# Patient Record
Sex: FEMALE | Race: WHITE | NOT HISPANIC OR LATINO | Employment: OTHER | URBAN - METROPOLITAN AREA
[De-identification: names, ages, dates, MRNs, and addresses within clinical notes are randomized per-mention and may not be internally consistent; named-entity substitution may affect disease eponyms.]

---

## 2023-06-13 ENCOUNTER — NURSING HOME VISIT (OUTPATIENT)
Dept: POST ACUTE CARE | Facility: EXTERNAL LOCATION | Age: 73
End: 2023-06-13
Payer: COMMERCIAL

## 2023-06-13 VITALS
SYSTOLIC BLOOD PRESSURE: 115 MMHG | DIASTOLIC BLOOD PRESSURE: 72 MMHG | BODY MASS INDEX: 28.49 KG/M2 | TEMPERATURE: 97.5 F | OXYGEN SATURATION: 93 % | HEART RATE: 100 BPM | WEIGHT: 145.9 LBS

## 2023-06-13 DIAGNOSIS — J43.8 OTHER EMPHYSEMA (MULTI): ICD-10-CM

## 2023-06-13 DIAGNOSIS — R33.8 ACUTE RETENTION OF URINE: ICD-10-CM

## 2023-06-13 DIAGNOSIS — R91.1 LUNG NODULE: Primary | ICD-10-CM

## 2023-06-13 PROBLEM — E78.5 HLD (HYPERLIPIDEMIA): Status: ACTIVE | Noted: 2023-06-13

## 2023-06-13 PROBLEM — F33.9 RECURRENT MAJOR DEPRESSIVE DISORDER (CMS-HCC): Status: ACTIVE | Noted: 2023-06-13

## 2023-06-13 PROBLEM — I10 HTN (HYPERTENSION): Status: ACTIVE | Noted: 2023-06-13

## 2023-06-13 PROBLEM — K21.9 GERD (GASTROESOPHAGEAL REFLUX DISEASE): Status: ACTIVE | Noted: 2023-06-13

## 2023-06-13 PROBLEM — S72.002A HIP FRACTURE, LEFT (MULTI): Status: RESOLVED | Noted: 2023-06-13 | Resolved: 2023-06-13

## 2023-06-13 PROBLEM — D64.9 CHRONIC ANEMIA: Status: ACTIVE | Noted: 2023-06-13

## 2023-06-13 PROBLEM — F10.10 ETOH ABUSE: Status: RESOLVED | Noted: 2023-06-13 | Resolved: 2023-06-13

## 2023-06-13 PROBLEM — E55.9 VITAMIN D DEFICIENCY: Status: ACTIVE | Noted: 2023-06-13

## 2023-06-13 PROBLEM — E46 HYPOALBUMINEMIA DUE TO PROTEIN-CALORIE MALNUTRITION (MULTI): Status: ACTIVE | Noted: 2023-06-13

## 2023-06-13 PROBLEM — E51.9 THIAMINE DEFICIENCY: Status: ACTIVE | Noted: 2023-06-13

## 2023-06-13 PROBLEM — C50.912 BREAST CANCER, LEFT (MULTI): Status: ACTIVE | Noted: 2023-06-13

## 2023-06-13 PROBLEM — E88.09 HYPOALBUMINEMIA DUE TO PROTEIN-CALORIE MALNUTRITION (MULTI): Status: ACTIVE | Noted: 2023-06-13

## 2023-06-13 PROBLEM — J44.9 COPD (CHRONIC OBSTRUCTIVE PULMONARY DISEASE) (MULTI): Status: ACTIVE | Noted: 2023-06-13

## 2023-06-13 PROBLEM — K59.09 CHRONIC CONSTIPATION: Status: ACTIVE | Noted: 2023-06-13

## 2023-06-13 PROCEDURE — 99349 HOME/RES VST EST MOD MDM 40: CPT | Performed by: NURSE PRACTITIONER

## 2023-06-13 RX ORDER — ERGOCALCIFEROL 1.25 MG/1
1 CAPSULE ORAL
COMMUNITY
Start: 2021-09-03

## 2023-06-13 RX ORDER — ACETAMINOPHEN 325 MG/1
TABLET ORAL EVERY 6 HOURS
COMMUNITY
Start: 2021-09-03

## 2023-06-13 RX ORDER — METOPROLOL SUCCINATE 25 MG/1
25 TABLET, EXTENDED RELEASE ORAL DAILY
COMMUNITY
Start: 2021-09-03

## 2023-06-13 RX ORDER — TORSEMIDE 20 MG/1
1 TABLET ORAL DAILY
COMMUNITY
Start: 2021-11-23

## 2023-06-13 RX ORDER — THIAMINE HCL 50 MG
1 TABLET ORAL DAILY
COMMUNITY
Start: 2021-09-03

## 2023-06-13 RX ORDER — PANTOPRAZOLE SODIUM 40 MG/1
1 TABLET, DELAYED RELEASE ORAL DAILY
COMMUNITY
Start: 2021-09-03

## 2023-06-13 RX ORDER — AMOXICILLIN 250 MG
1 CAPSULE ORAL DAILY
COMMUNITY
Start: 2021-09-03

## 2023-06-13 RX ORDER — TRAZODONE HYDROCHLORIDE 50 MG/1
1 TABLET ORAL NIGHTLY
COMMUNITY
Start: 2022-05-24

## 2023-06-13 RX ORDER — POTASSIUM CHLORIDE 1500 MG/1
1 TABLET, EXTENDED RELEASE ORAL DAILY
COMMUNITY
Start: 2021-09-03

## 2023-06-13 RX ORDER — MIRTAZAPINE 15 MG/1
15 TABLET, FILM COATED ORAL NIGHTLY
COMMUNITY
Start: 2021-09-03

## 2023-06-13 NOTE — PROGRESS NOTES
Subjective   Christie Guadarrama is a 72 y.o. female who is assisted living/ home patient being seen and evaluated for multiple medical problems.    72y old female returned after a hospital stay at [facility] for COPD,Hypoxia and RLL pnuemonia, also noted was 1.6cm nodule LUIS FERNANDO increased in size since 2018 (slow growing malignancy). She was treated with ATB, steroids and BIPAP. She was weaned to 3L NC and [place] back to [place]. She will follow up with pulmonary in 2 weeks (Dr Hernandez). She has a morse catheter secondary to urinary retention, if voiding trial fails she will follow up with urology.     She is doing well, ambulates with walker, 3L 02 with SPO2 97-98%, RA SPO2 reported as 93-95%. She reports some SOB with exertion. No distress.    50% of time was spent on preparing to see the patient, performing exam or evaluation, coordination of care, ordering medications,tests and labs, referring and communicating with other health care providers , interpreting results, obtaining and reviewing history, tests, medications and documenting clinical information  EMR. Time spent >35 minutes             Objective   /72   Pulse 100   Temp 36.4 °C (97.5 °F)   Wt 66.2 kg (145 lb 14.4 oz)   SpO2 93%   BMI 28.49 kg/m²     Physical Exam  Vitals and nursing note reviewed.   Constitutional:       General: She is not in acute distress.  HENT:      Head: Normocephalic and atraumatic.   Cardiovascular:      Rate and Rhythm: Normal rate and regular rhythm.   Pulmonary:      Effort: Pulmonary effort is normal.      Breath sounds: Normal breath sounds.   Skin:     General: Skin is warm and dry.   Neurological:      Mental Status: She is alert.   Psychiatric:         Mood and Affect: Mood normal.         Assessment/Plan   Problem List Items Addressed This Visit       Other emphysema (CMS/HCC)     Use 3LNC at HS  Can be on room air prn during the day  Check SP02 TID on RA with ambulation  Follow up pulmonary 2 weeks (Dr Hernandez)         Lung  nodule - Primary     Found 2018  1.6cm LUIS FERNANDO  Following with pulmonary         Acute retention of urine     DC morse catheter  If no void in 8 hrs, staff to do a bladder scan and report to MD or NP        Check CBC, BMP 6/16

## 2023-06-13 NOTE — ASSESSMENT & PLAN NOTE
Use 3LNC at HS  Can be on room air prn during the day  Check SP02 TID on RA with ambulation  Follow up pulmonary 2 weeks (Dr Hernandez)

## 2023-06-13 NOTE — LETTER
Patient: Christie Guadarrama  : 1950    Encounter Date: 2023    Subjective  Christie Guadarrama is a 72 y.o. female who is assisted living/ home patient being seen and evaluated for multiple medical problems.    72y old female returned after a hospital stay at [facility] for COPD,Hypoxia and RLL pnuemonia, also noted was 1.6cm nodule LUIS FERNANDO increased in size since 2018 (slow growing malignancy). She was treated with ATB, steroids and BIPAP. She was weaned to 3L NC and [place] back to [place]. She will follow up with pulmonary in 2 weeks (Dr Hernandez). She has a morse catheter secondary to urinary retention, if voiding trial fails she will follow up with urology.     She is doing well, ambulates with walker, 3L 02 with SPO2 97-98%, RA SPO2 reported as 93-95%. She reports some SOB with exertion. No distress.    50% of time was spent on preparing to see the patient, performing exam or evaluation, coordination of care, ordering medications,tests and labs, referring and communicating with other health care providers , interpreting results, obtaining and reviewing history, tests, medications and documenting clinical information  EMR. Time spent >35 minutes             Objective  /72   Pulse 100   Temp 36.4 °C (97.5 °F)   Wt 66.2 kg (145 lb 14.4 oz)   SpO2 93%   BMI 28.49 kg/m²     Physical Exam  Vitals and nursing note reviewed.   Constitutional:       General: She is not in acute distress.  HENT:      Head: Normocephalic and atraumatic.   Cardiovascular:      Rate and Rhythm: Normal rate and regular rhythm.   Pulmonary:      Effort: Pulmonary effort is normal.      Breath sounds: Normal breath sounds.   Skin:     General: Skin is warm and dry.   Neurological:      Mental Status: She is alert.   Psychiatric:         Mood and Affect: Mood normal.         Assessment/Plan  Problem List Items Addressed This Visit       Other emphysema (CMS/HCC)     Use 3LNC at HS  Can be on room air prn during the day  Check SP02 TID on RA with  ambulation  Follow up pulmonary 2 weeks (Dr Hernandez)         Lung nodule - Primary     Found 2018  1.6cm LUIS FERNANDO  Following with pulmonary         Acute retention of urine     DC morse catheter  If no void in 8 hrs, staff to do a bladder scan and report to MD or NP        Check CBC, BMP 6/16      Electronically Signed By: BEATRIZ Benjamin-CNP   6/13/23  1:19 PM

## 2023-06-15 ENCOUNTER — NURSING HOME VISIT (OUTPATIENT)
Dept: POST ACUTE CARE | Facility: EXTERNAL LOCATION | Age: 73
End: 2023-06-15
Payer: COMMERCIAL

## 2023-06-15 DIAGNOSIS — R33.8 ACUTE RETENTION OF URINE: ICD-10-CM

## 2023-06-15 DIAGNOSIS — J43.8 OTHER EMPHYSEMA (MULTI): Primary | ICD-10-CM

## 2023-06-15 PROCEDURE — 99348 HOME/RES VST EST LOW MDM 30: CPT | Performed by: NURSE PRACTITIONER

## 2023-06-15 NOTE — LETTER
Patient: Christie Guadarrama  : 1950    Encounter Date: 06/15/2023    Subjective  Christie Guadarrama is a 72 y.o. female who is assisted living/ home patient being seen and evaluated for multiple medical problems.    Follow up on urine retention. Amador was discontinued last visit and she is voiding well. Her SP02 is stable 94-97% during the day. She has follow up with pulmonary and should use 02 while sleeping and PRN during the day. She has a pulse ox and does monitor it herself. Instructed to use 02 if SP02 less than 93%    50% of time was spent on preparing to see the patient, performing exam or evaluation, coordination of care, ordering medications,tests and labs, referring and communicating with other health care providers , interpreting results, obtaining and reviewing history, tests, medications and documenting clinical information  EMR. Time spent >35 minutes             Objective  There were no vitals taken for this visit.    Physical Exam  Vitals and nursing note reviewed.   Constitutional:       General: She is not in acute distress.  HENT:      Head: Normocephalic and atraumatic.   Cardiovascular:      Rate and Rhythm: Normal rate and regular rhythm.   Pulmonary:      Effort: Pulmonary effort is normal.      Breath sounds: Normal breath sounds.      Comments: Diminished breath sounds bilaterally  Skin:     General: Skin is warm and dry.   Neurological:      Mental Status: She is alert.   Psychiatric:         Mood and Affect: Mood normal.         Assessment/Plan  Problem List Items Addressed This Visit       Other emphysema (CMS/HCC) - Primary     Patient stable, will continue same treatment and monitor. Nursing to inform CNP/MD of any changes in condition or new problems         Acute retention of urine     Resolved  Aamdor has been removed              Electronically Signed By: BEATRIZ Benjamin-CNP   6/15/23  6:58 PM

## 2023-06-15 NOTE — PROGRESS NOTES
Subjective   Christie Guadarrama is a 72 y.o. female who is assisted living/ home patient being seen and evaluated for multiple medical problems.    Follow up on urine retention. Amador was discontinued last visit and she is voiding well. Her SP02 is stable 94-97% during the day. She has follow up with pulmonary and should use 02 while sleeping and PRN during the day. She has a pulse ox and does monitor it herself. Instructed to use 02 if SP02 less than 93%    50% of time was spent on preparing to see the patient, performing exam or evaluation, coordination of care, ordering medications,tests and labs, referring and communicating with other health care providers , interpreting results, obtaining and reviewing history, tests, medications and documenting clinical information  EMR. Time spent >35 minutes             Objective   There were no vitals taken for this visit.    Physical Exam  Vitals and nursing note reviewed.   Constitutional:       General: She is not in acute distress.  HENT:      Head: Normocephalic and atraumatic.   Cardiovascular:      Rate and Rhythm: Normal rate and regular rhythm.   Pulmonary:      Effort: Pulmonary effort is normal.      Breath sounds: Normal breath sounds.      Comments: Diminished breath sounds bilaterally  Skin:     General: Skin is warm and dry.   Neurological:      Mental Status: She is alert.   Psychiatric:         Mood and Affect: Mood normal.         Assessment/Plan   Problem List Items Addressed This Visit       Other emphysema (CMS/HCC) - Primary     Patient stable, will continue same treatment and monitor. Nursing to inform CNP/MD of any changes in condition or new problems         Acute retention of urine     Resolved  Amador has been removed

## 2023-06-15 NOTE — ASSESSMENT & PLAN NOTE
Patient stable, will continue same treatment and monitor. Nursing to inform CNP/MD of any changes in condition or new problems

## 2023-07-13 ENCOUNTER — NURSING HOME VISIT (OUTPATIENT)
Dept: POST ACUTE CARE | Facility: EXTERNAL LOCATION | Age: 73
End: 2023-07-13
Payer: COMMERCIAL

## 2023-07-13 DIAGNOSIS — R91.1 LUNG NODULE: ICD-10-CM

## 2023-07-13 DIAGNOSIS — J43.8 OTHER EMPHYSEMA (MULTI): Primary | ICD-10-CM

## 2023-07-13 PROCEDURE — 99349 HOME/RES VST EST MOD MDM 40: CPT | Performed by: NURSE PRACTITIONER

## 2023-07-13 NOTE — LETTER
Patient: Christie Guadarrama  : 1950    Encounter Date: 2023    Subjective  Christie Guadarrama is a 72 y.o. female who is assisted living/ home patient being seen and evaluated for multiple medical problems.    Resident requested a visit because she does not want to use her oxygen, she has not required it during the day, she is not SOB, SPO2 98% on RA, lungs are CTA. A call was placed to her sister regarding the last pulmonary office visit she had and what they may have recommended.     50% of time was spent on preparing to see the patient, performing exam or evaluation, coordination of care, ordering medications,tests and labs, referring and communicating with other health care providers , interpreting results, obtaining and reviewing history, tests, medications and documenting clinical information  EMR. Time spent >35 minutes             Objective  /80   Pulse 76   Temp 36.7 °C (98.1 °F)   Resp 18   SpO2 97% Comment: room air    Physical Exam  Vitals and nursing note reviewed.   Constitutional:       General: She is not in acute distress.  HENT:      Head: Normocephalic and atraumatic.   Cardiovascular:      Rate and Rhythm: Normal rate and regular rhythm.   Pulmonary:      Effort: Pulmonary effort is normal.      Breath sounds: Normal breath sounds. No stridor. No wheezing or rhonchi.   Skin:     General: Skin is warm and dry.   Neurological:      Mental Status: She is alert.   Psychiatric:         Mood and Affect: Mood normal.         Assessment/Plan  Problem List Items Addressed This Visit       Other emphysema (CMS/HCC) - Primary     OK to DC 02 during the day  Nurse to check SPO2 on room air while asleep  Follow up with pulmonary          Lung nodule         Electronically Signed By: BEATRIZ Benjamin-CNP   23 11:11 AM

## 2023-07-14 VITALS
OXYGEN SATURATION: 97 % | HEART RATE: 76 BPM | RESPIRATION RATE: 18 BRPM | TEMPERATURE: 98.1 F | DIASTOLIC BLOOD PRESSURE: 80 MMHG | SYSTOLIC BLOOD PRESSURE: 118 MMHG

## 2023-07-14 NOTE — PROGRESS NOTES
Subjective   Christie Guadarrama is a 72 y.o. female who is assisted living/ home patient being seen and evaluated for multiple medical problems.    Resident requested a visit because she does not want to use her oxygen, she has not required it during the day, she is not SOB, SPO2 98% on RA, lungs are CTA. A call was placed to her sister regarding the last pulmonary office visit she had and what they may have recommended.     50% of time was spent on preparing to see the patient, performing exam or evaluation, coordination of care, ordering medications,tests and labs, referring and communicating with other health care providers , interpreting results, obtaining and reviewing history, tests, medications and documenting clinical information  EMR. Time spent >35 minutes             Objective   /80   Pulse 76   Temp 36.7 °C (98.1 °F)   Resp 18   SpO2 97% Comment: room air    Physical Exam  Vitals and nursing note reviewed.   Constitutional:       General: She is not in acute distress.  HENT:      Head: Normocephalic and atraumatic.   Cardiovascular:      Rate and Rhythm: Normal rate and regular rhythm.   Pulmonary:      Effort: Pulmonary effort is normal.      Breath sounds: Normal breath sounds. No stridor. No wheezing or rhonchi.   Skin:     General: Skin is warm and dry.   Neurological:      Mental Status: She is alert.   Psychiatric:         Mood and Affect: Mood normal.         Assessment/Plan   Problem List Items Addressed This Visit       Other emphysema (CMS/HCC) - Primary     OK to DC 02 during the day  Nurse to check SPO2 on room air while asleep  Follow up with pulmonary          Lung nodule

## 2023-07-14 NOTE — ASSESSMENT & PLAN NOTE
OK to DC 02 during the day  Nurse to check SPO2 on room air while asleep  Follow up with pulmonary

## 2023-07-18 ENCOUNTER — NURSING HOME VISIT (OUTPATIENT)
Dept: POST ACUTE CARE | Facility: EXTERNAL LOCATION | Age: 73
End: 2023-07-18
Payer: COMMERCIAL

## 2023-07-18 DIAGNOSIS — R91.1 LUNG NODULE: ICD-10-CM

## 2023-07-18 DIAGNOSIS — J43.8 OTHER EMPHYSEMA (MULTI): Primary | ICD-10-CM

## 2023-07-18 PROCEDURE — 99349 HOME/RES VST EST MOD MDM 40: CPT | Performed by: NURSE PRACTITIONER

## 2023-07-18 NOTE — PROGRESS NOTES
+Epigastric tenderness, onset after eating, relieved with GI cocktail  -Lipase elevated  -Abdominal U/S pending  -Mgmt as per hospital medicine     Subjective   Christie Guadarrama is a 72 y.o. female who is assisted living/ home patient being seen and evaluated for multiple medical problems.    Resident seen today as follow up, her SP02 on Room air at HS is 93% and 98% during the day,  she still getting aerosol treatment BID. Spoke with her and her sister, POA regarding discontinuing 02. She has follow up with pulmonary in September for PFT's and visit. She will also get repeat CT scan of lungs to monitor a lung nodule that has been stable for 5 years now.  She is not SOB and has improved greatly since her hospitalization.     50% of time was spent on preparing to see the patient, performing exam or evaluation, coordination of care, ordering medications,tests and labs, referring and communicating with other health care providers , interpreting results, obtaining and reviewing history, tests, medications and documenting clinical information  EMR. Time spent >35 minutes             Objective       Physical Exam  Vitals and nursing note reviewed.   Constitutional:       General: She is not in acute distress.  HENT:      Head: Normocephalic and atraumatic.   Cardiovascular:      Rate and Rhythm: Normal rate and regular rhythm.   Pulmonary:      Effort: Pulmonary effort is normal.      Breath sounds: Normal breath sounds. No stridor. No wheezing or rhonchi.   Skin:     General: Skin is warm and dry.   Neurological:      Mental Status: She is alert.   Psychiatric:         Mood and Affect: Mood normal.         Assessment/Plan   Problem List Items Addressed This Visit       Other emphysema (CMS/HCC) - Primary     OK to DC oxygen  Monitor SPO2 at HS while lying down x 2 weeks  Follow up Pulmonary as scheduled         Lung nodule

## 2023-07-18 NOTE — LETTER
Patient: Christie Guadarrama  : 1950    Encounter Date: 2023    Subjective  Christie Guadarrama is a 72 y.o. female who is assisted living/ home patient being seen and evaluated for multiple medical problems.    Resident seen today as follow up, her SP02 on Room air at HS is 93% and 98% during the day,  she still getting aerosol treatment BID. Spoke with her and her sister, POA regarding discontinuing 02. She has follow up with pulmonary in September for PFT's and visit. She will also get repeat CT scan of lungs to monitor a lung nodule that has been stable for 5 years now.  She is not SOB and has improved greatly since her hospitalization.     50% of time was spent on preparing to see the patient, performing exam or evaluation, coordination of care, ordering medications,tests and labs, referring and communicating with other health care providers , interpreting results, obtaining and reviewing history, tests, medications and documenting clinical information  EMR. Time spent >35 minutes             Objective      Physical Exam  Vitals and nursing note reviewed.   Constitutional:       General: She is not in acute distress.  HENT:      Head: Normocephalic and atraumatic.   Cardiovascular:      Rate and Rhythm: Normal rate and regular rhythm.   Pulmonary:      Effort: Pulmonary effort is normal.      Breath sounds: Normal breath sounds. No stridor. No wheezing or rhonchi.   Skin:     General: Skin is warm and dry.   Neurological:      Mental Status: She is alert.   Psychiatric:         Mood and Affect: Mood normal.         Assessment/Plan  Problem List Items Addressed This Visit       Other emphysema (CMS/HCC) - Primary     OK to DC oxygen  Monitor SPO2 at HS while lying down x 2 weeks  Follow up Pulmonary as scheduled         Lung nodule         Electronically Signed By: BEATRIZ Benjamin-CNP   23  4:53 PM

## 2024-01-05 ENCOUNTER — NURSING HOME VISIT (OUTPATIENT)
Dept: POST ACUTE CARE | Facility: EXTERNAL LOCATION | Age: 74
End: 2024-01-05
Payer: MEDICARE

## 2024-01-05 DIAGNOSIS — I10 HYPERTENSION, UNSPECIFIED TYPE: ICD-10-CM

## 2024-01-05 DIAGNOSIS — J43.8 OTHER EMPHYSEMA (MULTI): Primary | ICD-10-CM

## 2024-01-05 PROCEDURE — 99348 HOME/RES VST EST LOW MDM 30: CPT | Performed by: INTERNAL MEDICINE

## 2024-01-05 NOTE — LETTER
Patient: Christie Guadarrama  : 1950    Encounter Date: 2024    Follow-up visit  Patient has no significant plaints.  On occasion she does feel a bit short of breath with this appears to be stable in pattern and has no trouble ambulating overall.  No other issues reported by staff.    Medications orders and labs were reviewed.    Review of systems  No complaint of cough fever chills malaise  No GI complaints or distress.  No dysuria.  No other issues reported by staff.    Physical exam  Blood pressure is 160/69 although overall trends seem quite acceptable, temp is 97.6, pulse is 65, pulse ox is 97% on room air.  She is alert and orient x 3 no apparent distress.  Lungs demonstrate faint end expiratory wheezes with forced expiration.  Heart rate and rhythm is regular.  Abdomen is soft nontender no edema    Assessment and care plan  COPD/emphysema with some evidence of mild bronchospasm and occasional mild shortness of breath.  We will change her DuoNeb aerosols to twice a day maintenance dosing daily and then every 4 hours as needed for shortness of breath or wheezing.  She is also on budesonide aerosols twice a day, we will cut this to once a day in view of the above changes with DuoNeb.  We will continue to monitor her respiratory status      Electronically Signed By: Jovanni Light DO   24  3:34 PM

## 2024-01-05 NOTE — PROGRESS NOTES
Follow-up visit  Patient has no significant plaints.  On occasion she does feel a bit short of breath with this appears to be stable in pattern and has no trouble ambulating overall.  No other issues reported by staff.    Medications orders and labs were reviewed.    Review of systems  No complaint of cough fever chills malaise  No GI complaints or distress.  No dysuria.  No other issues reported by staff.    Physical exam  Blood pressure is 160/69 although overall trends seem quite acceptable, temp is 97.6, pulse is 65, pulse ox is 97% on room air.  She is alert and orient x 3 no apparent distress.  Lungs demonstrate faint end expiratory wheezes with forced expiration.  Heart rate and rhythm is regular.  Abdomen is soft nontender no edema    Assessment and care plan  COPD/emphysema with some evidence of mild bronchospasm and occasional mild shortness of breath.  We will change her DuoNeb aerosols to twice a day maintenance dosing daily and then every 4 hours as needed for shortness of breath or wheezing.  She is also on budesonide aerosols twice a day, we will cut this to once a day in view of the above changes with DuoNeb.  We will continue to monitor her respiratory status

## 2024-03-08 ENCOUNTER — NURSING HOME VISIT (OUTPATIENT)
Dept: POST ACUTE CARE | Facility: EXTERNAL LOCATION | Age: 74
End: 2024-03-08
Payer: MEDICARE

## 2024-03-08 VITALS
RESPIRATION RATE: 16 BRPM | HEART RATE: 70 BPM | TEMPERATURE: 97.7 F | SYSTOLIC BLOOD PRESSURE: 129 MMHG | OXYGEN SATURATION: 97 % | DIASTOLIC BLOOD PRESSURE: 68 MMHG

## 2024-03-08 DIAGNOSIS — J43.8 OTHER EMPHYSEMA (MULTI): ICD-10-CM

## 2024-03-08 DIAGNOSIS — R05.1 ACUTE COUGH: Primary | ICD-10-CM

## 2024-03-08 DIAGNOSIS — E55.9 VITAMIN D DEFICIENCY: ICD-10-CM

## 2024-03-08 DIAGNOSIS — I10 HYPERTENSION, UNSPECIFIED TYPE: ICD-10-CM

## 2024-03-08 PROCEDURE — 99349 HOME/RES VST EST MOD MDM 40: CPT | Performed by: NURSE PRACTITIONER

## 2024-03-08 ASSESSMENT — ENCOUNTER SYMPTOMS
PALPITATIONS: 0
SHORTNESS OF BREATH: 0
ABDOMINAL PAIN: 0
FEVER: 0
NAUSEA: 0
CHILLS: 0
DIFFICULTY URINATING: 0
FATIGUE: 0
CONSTIPATION: 0
DIARRHEA: 0
COUGH: 0
VOMITING: 0

## 2024-03-08 NOTE — ASSESSMENT & PLAN NOTE
She developed a cough  a few days ago, has duoneb and routine and prn and cough is improving.  No sob.    Will add prn mucinex to help mobilzie secretions.  She and staff aware that if she develops any sob or wheezing to notify and consider steroids and /or chest xray.  Nontoxic appearing.

## 2024-03-08 NOTE — PROGRESS NOTES
Subjective   Christie Guadarrama is a 73 y.o. female requested  to be evaluated for cough  HPI  She developed a cough a few days ago, per Christie and staff she is already feeling better. No fever, chills, chest pain or shortness of breath.  Gaurav has  ahx of COPD, feels breathing close to her normal.  There are no family members present during time of visit.    she  denies any complaints when asked.  Eating and drinking well.   No concerns per staff.       Review of Systems   Constitutional:  Negative for chills, fatigue and fever.   Respiratory:  Negative for cough and shortness of breath.    Cardiovascular:  Negative for chest pain and palpitations.   Gastrointestinal:  Negative for abdominal pain, constipation, diarrhea, nausea and vomiting.   Genitourinary:  Negative for difficulty urinating.       Objective   /68   Pulse 70   Temp 36.5 °C (97.7 °F)   Resp 16   SpO2 97%     Physical Exam  Vitals and nursing note reviewed.   Constitutional:       General: She is not in acute distress.  HENT:      Head: Normocephalic and atraumatic.   Cardiovascular:      Rate and Rhythm: Normal rate and regular rhythm.   Pulmonary:      Effort: Pulmonary effort is normal.      Breath sounds: Normal breath sounds. No stridor. No wheezing or rhonchi.   Skin:     General: Skin is warm and dry.   Neurological:      Mental Status: She is alert.   Psychiatric:         Mood and Affect: Mood normal.         Assessment/Plan   Problem List Items Addressed This Visit       Other emphysema (CMS/HCC)     No sx of actue exac, mild acute cough that is resolving.          HTN (hypertension)     On metoprolol, bp acceptable.  Continue to monitor and adjust meds based on clinical course.           Vitamin D deficiency     On vitamin D, monitor with routine labs.           Acute cough - Primary     She developed a cough  a few days ago, has duoneb and routine and prn and cough is improving.  No sob.    Will add prn mucinex to help mobilzie  secretions.  She and staff aware that if she develops any sob or wheezing to notify and consider steroids and /or chest xray.  Nontoxic appearing.         Cough is resolving on its own, add prn mucinex and staff to monitor for any worsening of sx.

## 2024-03-08 NOTE — LETTER
Patient: Christie Guadarrama  : 1950    Encounter Date: 2024    Subjective  Christie Guadarrama is a 73 y.o. female requested  to be evaluated for cough  HPI  She developed a cough a few days ago, per Christie and staff she is already feeling better. No fever, chills, chest pain or shortness of breath.  SAHe has  ahx of COPD, feels breathing close to her normal.  There are no family members present during time of visit.    she  denies any complaints when asked.  Eating and drinking well.   No concerns per staff.       Review of Systems   Constitutional:  Negative for chills, fatigue and fever.   Respiratory:  Negative for cough and shortness of breath.    Cardiovascular:  Negative for chest pain and palpitations.   Gastrointestinal:  Negative for abdominal pain, constipation, diarrhea, nausea and vomiting.   Genitourinary:  Negative for difficulty urinating.       Objective  /68   Pulse 70   Temp 36.5 °C (97.7 °F)   Resp 16   SpO2 97%     Physical Exam  Vitals and nursing note reviewed.   Constitutional:       General: She is not in acute distress.  HENT:      Head: Normocephalic and atraumatic.   Cardiovascular:      Rate and Rhythm: Normal rate and regular rhythm.   Pulmonary:      Effort: Pulmonary effort is normal.      Breath sounds: Normal breath sounds. No stridor. No wheezing or rhonchi.   Skin:     General: Skin is warm and dry.   Neurological:      Mental Status: She is alert.   Psychiatric:         Mood and Affect: Mood normal.         Assessment/Plan  Problem List Items Addressed This Visit       Other emphysema (CMS/HCC)     No sx of actue exac, mild acute cough that is resolving.          HTN (hypertension)     On metoprolol, bp acceptable.  Continue to monitor and adjust meds based on clinical course.           Vitamin D deficiency     On vitamin D, monitor with routine labs.           Acute cough - Primary     She developed a cough  a few days ago, has duoneb and routine and prn and cough is  improving.  No sob.    Will add prn mucinex to help mobilzie secretions.  She and staff aware that if she develops any sob or wheezing to notify and consider steroids and /or chest xray.  Nontoxic appearing.         Cough is resolving on its own, add prn mucinex and staff to monitor for any worsening of sx.       Electronically Signed By: MAKEDA Arnold   3/8/24 12:39 PM

## 2024-04-04 ENCOUNTER — DOCUMENTATION (OUTPATIENT)
Dept: CARE COORDINATION | Facility: CLINIC | Age: 74
End: 2024-04-04
Payer: MEDICARE

## 2024-04-04 PROCEDURE — G0180 MD CERTIFICATION HHA PATIENT: HCPCS | Performed by: INTERNAL MEDICINE

## 2024-04-09 ENCOUNTER — PATIENT OUTREACH (OUTPATIENT)
Dept: CARE COORDINATION | Facility: CLINIC | Age: 74
End: 2024-04-09
Payer: MEDICARE

## 2024-04-09 ENCOUNTER — NURSING HOME VISIT (OUTPATIENT)
Dept: POST ACUTE CARE | Facility: EXTERNAL LOCATION | Age: 74
End: 2024-04-09
Payer: MEDICARE

## 2024-04-09 DIAGNOSIS — J43.8 OTHER EMPHYSEMA (MULTI): Primary | ICD-10-CM

## 2024-04-09 DIAGNOSIS — J96.02 ACUTE RESPIRATORY FAILURE WITH HYPOXIA AND HYPERCAPNIA (MULTI): ICD-10-CM

## 2024-04-09 DIAGNOSIS — J96.01 ACUTE RESPIRATORY FAILURE WITH HYPOXIA AND HYPERCAPNIA (MULTI): ICD-10-CM

## 2024-04-09 PROCEDURE — 99348 HOME/RES VST EST LOW MDM 30: CPT | Performed by: INTERNAL MEDICINE

## 2024-04-09 NOTE — PROGRESS NOTES
Patient discharged from [facility] with acute hypercapnic respiratory failure. Called home number twice for MARI. Phone number is disconnected.     Gina Khanna RN   Nurse Care Manager   [facility]   [phone]

## 2024-04-09 NOTE — LETTER
Patient: Christie Guadarrama  : 1950    Encounter Date: 2024    Follow-up visit  Patient was recently hospitalized for acute hypoxic and hypercapnic respiratory failure.  It appeared to be an exacerbation of her underlying COPD and emphysema rather than any infectious etiology.  She was discharged from the hospital and spent a number of days in skilled nursing.  She has subsequently been transferred back to assisted living.  She is requesting that we DC her oxygen as soon as possible    Medications and orders were reviewed.    Review of systems  Patient denies pain  Patient denies shortness of breath cough fever chills sputum production or malaise  No GI distress reported no nausea vomiting or diarrhea  No dysuria    On physical exam  Blood pressure is 141/72 temp is 98 1 pulse is 90 pulse ox is 96% on 1 L of nasal cannula she is alert oriented x 3 no apparent distress.  HEENT is of the eyes unremarkable except for some nasal congestion and hoarseness  Lungs demonstrate bilateral diminished breath sounds but no evidence of wheezes or rhonchi  Heart rate and rhythm is regular  Abdomen soft nontender  There is no peripheral edema    Assessment and care plan  Recent exacerbation of emphysema and COPD with hypercapnic and hypoxic respiratory failure which appears to have improved.  We will continue aerosol regimen and inhaled steroids as currently ordered.  We will wean her down off of O2 as feasible to keep her pulse ox 90% or higher to avoid hypercapnia      Electronically Signed By: Jovanni Light DO   24 11:21 AM

## 2024-04-16 ENCOUNTER — NURSING HOME VISIT (OUTPATIENT)
Dept: POST ACUTE CARE | Facility: EXTERNAL LOCATION | Age: 74
End: 2024-04-16
Payer: MEDICARE

## 2024-04-16 DIAGNOSIS — F33.9 RECURRENT MAJOR DEPRESSIVE DISORDER, REMISSION STATUS UNSPECIFIED (CMS-HCC): ICD-10-CM

## 2024-04-16 DIAGNOSIS — F41.9 ANXIETY: ICD-10-CM

## 2024-04-16 DIAGNOSIS — R33.8 ACUTE RETENTION OF URINE: ICD-10-CM

## 2024-04-16 DIAGNOSIS — J43.8 OTHER EMPHYSEMA (MULTI): Primary | ICD-10-CM

## 2024-04-16 DIAGNOSIS — J96.11 CHRONIC HYPOXIC RESPIRATORY FAILURE (MULTI): ICD-10-CM

## 2024-04-16 PROCEDURE — 99349 HOME/RES VST EST MOD MDM 40: CPT | Performed by: INTERNAL MEDICINE

## 2024-04-16 NOTE — LETTER
Patient: Christie Guadarrama  : 1950    Encounter Date: 2024    Follow-up visit  Patient seen today at patient's request.  Patient states that she is having trouble sleeping.  She denies feeling anxious but staff reports that she frequently calls during the night to have someone come and hold her hand.  And that she appears quite anxious.  Patient does have a history of depression.  Currently she is reluctant to leave her room.  When asked if she felt blue or depressed she was unclear but seems willing to take an antidepressant.  Patient also questions the continued need of oxygen and would like not to have it.  We have discussed this with staff and currently on 1 to 2 L of nasal cannula she is at 91%.    Medications and orders were reviewed.  Currently she is on as needed DuoNebs every 4 hours as needed she also was placed on trazodone 50 mg at bedtime for sleep.    Review of systems  No other issues reported.    Physical exam  Blood pressure is 136/74 temp is 97.2, pulse is 72, pulse ox is 91% on 2 L of nasal cannula.  HEENT is grossly unremarkable.  Her congestion and hoarseness sounds improved.  Lungs demonstrate mid expiratory wheezes throughout all lung fields.  Heart rate and rhythm is regular.  There is no peripheral edema.  Neurologically no evidence of tremors.    Assessment and care plan  Patient has a history of COPD or emphysema.  She has a recent exacerbation of this but currently has been unable to wean off of oxygen.  We will attempt to do so and check her room air pulse ox is as well.  We will continue to monitor and attempt to wean her off of O2.  The patient does demonstrate evidence of active bronchospasm so we will change her DuoNeb aerosols to 3 times a day regular administration and every 4 hours as needed.    Because of her major depressive history we will start Lexapro 5 mg nightly.  We will discontinue the trazodone.  Since there is a significant episode of anxiety particularly during  the night which is likely multifactorial including her noncompliance with O2 at times we will start the use of Ativan 1 mg at bedtime and monitor      Electronically Signed By: Jovanni Light DO   4/16/24 11:49 AM

## 2024-04-16 NOTE — PROGRESS NOTES
Follow-up visit  Patient seen today at patient's request.  Patient states that she is having trouble sleeping.  She denies feeling anxious but staff reports that she frequently calls during the night to have someone come and hold her hand.  And that she appears quite anxious.  Patient does have a history of depression.  Currently she is reluctant to leave her room.  When asked if she felt blue or depressed she was unclear but seems willing to take an antidepressant.  Patient also questions the continued need of oxygen and would like not to have it.  We have discussed this with staff and currently on 1 to 2 L of nasal cannula she is at 91%.    Medications and orders were reviewed.  Currently she is on as needed DuoNebs every 4 hours as needed she also was placed on trazodone 50 mg at bedtime for sleep.    Review of systems  No other issues reported.    Physical exam  Blood pressure is 136/74 temp is 97.2, pulse is 72, pulse ox is 91% on 2 L of nasal cannula.  HEENT is grossly unremarkable.  Her congestion and hoarseness sounds improved.  Lungs demonstrate mid expiratory wheezes throughout all lung fields.  Heart rate and rhythm is regular.  There is no peripheral edema.  Neurologically no evidence of tremors.    Assessment and care plan  Patient has a history of COPD or emphysema.  She has a recent exacerbation of this but currently has been unable to wean off of oxygen.  We will attempt to do so and check her room air pulse ox is as well.  We will continue to monitor and attempt to wean her off of O2.  The patient does demonstrate evidence of active bronchospasm so we will change her DuoNeb aerosols to 3 times a day regular administration and every 4 hours as needed.    Because of her major depressive history we will start Lexapro 5 mg nightly.  We will discontinue the trazodone.  Since there is a significant episode of anxiety particularly during the night which is likely multifactorial including her noncompliance  with O2 at times we will start the use of Ativan 1 mg at bedtime and monitor    Addendum  Staff also reports that the patient has urinary frequency at night.  No reported urinary frequency during the day.  This may be due to anxiety.  However we will obtain a UA with CNS and also check postvoid bladder scans given the patient's history of urinary retention

## 2024-04-29 ENCOUNTER — PATIENT OUTREACH (OUTPATIENT)
Dept: CARE COORDINATION | Facility: CLINIC | Age: 74
End: 2024-04-29
Payer: MEDICARE

## 2024-04-29 NOTE — PROGRESS NOTES
"After several attempts connected with sister ANGELES- Harriet.   Discussed reason for my call.   This care manager is not able to download all of the [facility] dc summary.   Discussed with Harriet. Patient is doing \"better \".   Is currently at [facility]. Dr. De León does rounds on patients.  Patient has support from a RN at the [facility] and aids.   Advised Harriet to reach out to [facility] they are trying to get a hold of her.       Aditi at the [facility] living [facility] in [place], [place]    Address: [address], [place] [address]  Phone: [phone]    Medications  Medications reviewed with patient/caregiver?: No (4/29/2024 11:17 AM)  Is the patient having any side effects they believe may be caused by any medication additions or changes?: No (4/29/2024 11:17 AM)  Does the patient have all medications ordered at discharge?: Yes (4/29/2024 11:17 AM)  Care Management Interventions: Provided patient education (4/29/2024 11:17 AM)  Prescription Comments: pateint at an al do not have all of the dc summar (4/29/2024 11:17 AM)  Is the patient taking all medications as directed (includes completed medication regime)?: Yes (4/29/2024 11:17 AM)  Care Management Interventions: Provided patient education (4/29/2024 11:17 AM)    Appointments  Does the patient have a primary care provider?: Yes ( sees patient at the al he does rounds every few weeks and has an an NP too.) (4/29/2024 11:17 AM)    Self Management  What is the home health agency?: has support at al (4/29/2024 11:17 AM)  DME Interventions: -- (has walker, wc and o2 at al) (4/29/2024 11:17 AM)    Patient Teaching  Does the patient have access to their discharge instructions?: Yes (4/29/2024 11:17 AM)  Care Management Interventions: Reviewed instructions with patient (4/29/2024 11:17 AM)  What is the patient's perception of their health status since discharge?: Improving (4/29/2024 11:17 AM)  Is the patient/caregiver able to teach back the hierarchy " of who to call/visit for symptoms/problems? PCP, Specialist, Home Health nurse, Urgent Care, ED, 911: Yes (4/29/2024 11:17 AM)      Gina Khanna , RN   Nurse Care Manager   [facility]   [phone]

## 2024-04-30 ENCOUNTER — NURSING HOME VISIT (OUTPATIENT)
Dept: POST ACUTE CARE | Facility: EXTERNAL LOCATION | Age: 74
End: 2024-04-30
Payer: MEDICARE

## 2024-04-30 VITALS
HEART RATE: 87 BPM | DIASTOLIC BLOOD PRESSURE: 64 MMHG | SYSTOLIC BLOOD PRESSURE: 136 MMHG | TEMPERATURE: 97.5 F | WEIGHT: 147 LBS | BODY MASS INDEX: 28.71 KG/M2 | OXYGEN SATURATION: 95 %

## 2024-04-30 DIAGNOSIS — J96.11 CHRONIC HYPOXIC RESPIRATORY FAILURE (MULTI): Primary | ICD-10-CM

## 2024-04-30 DIAGNOSIS — R33.8 ACUTE RETENTION OF URINE: ICD-10-CM

## 2024-04-30 DIAGNOSIS — Z00.00 ROUTINE GENERAL MEDICAL EXAMINATION AT HEALTH CARE FACILITY: ICD-10-CM

## 2024-04-30 PROCEDURE — G0439 PPPS, SUBSEQ VISIT: HCPCS | Performed by: INTERNAL MEDICINE

## 2024-04-30 PROCEDURE — 99349 HOME/RES VST EST MOD MDM 40: CPT | Performed by: INTERNAL MEDICINE

## 2024-04-30 ASSESSMENT — ACTIVITIES OF DAILY LIVING (ADL)
DOING_HOUSEWORK: TOTAL CARE
MANAGING_FINANCES: NEEDS ASSISTANCE
DRESSING: NEEDS ASSISTANCE
BATHING: NEEDS ASSISTANCE
GROCERY_SHOPPING: TOTAL CARE
TAKING_MEDICATION: TOTAL CARE

## 2024-04-30 ASSESSMENT — PATIENT HEALTH QUESTIONNAIRE - PHQ9
1. LITTLE INTEREST OR PLEASURE IN DOING THINGS: MORE THAN HALF THE DAYS
2. FEELING DOWN, DEPRESSED OR HOPELESS: MORE THAN HALF THE DAYS
SUM OF ALL RESPONSES TO PHQ9 QUESTIONS 1 AND 2: 4

## 2024-04-30 NOTE — LETTER
Patient: Christie Guadarrama  : 1950    Encounter Date: 2024    Subjective  Reason for Visit: Christie Guadarrama is an 73 y.o. female here for a Medicare Wellness visit.      Also recently hospitalized for acute hypercapnic and hypoxic respiratory failure.  She ended up intubated in the intensive care unit.  Treated empirically with antibiotics.  Subsequently patient was able to be weaned and stabilized.  Prior to discharge she did develop urinary retention and Amador catheter was placed with the intention of Amador removal trial 5 days postdischarge    Medications orders clinical notes and labs were reviewed    Patient Care Team:  Jovanni Light DO as PCP - [employer] Medicare Advantage PCP  Gina Mendenhall RN as Care Manager (Case Management)     Review of Systems    Objective  Vitals:  /64   Pulse 87   Temp 36.4 °C (97.5 °F)   Wt 66.7 kg (147 lb)   SpO2 95%   BMI 28.71 kg/m²       Physical Exam  HEENT is grossly unremarkable.  The patient is in no acute distress.  She does appear somewhat weak and slightly dyspneic.  Using continuous O2.  Lungs are clear with diminished breath sounds  Heart rate and rhythm is regular.  Abdomen is soft nontender.  No peripheral edema.  Neurologically no focal deficits or tremors.  Amador catheter is in place and draining clear yellow urine.  There was some difficulty yesterday with leakage around the Amador however the Amador is very main draining appropriately and the patient requested we get it out as soon as feasible    Assessment/Plan  Status post hospitalization for acute on chronic hypoxic and hypercapnic respiratory failure.  Currently the patient appears to be stable and we will continue to monitor.  Since there is a risk of apnea we will ask the staff to maintain her O2 at the lowest flow rate to keep her above 90% to avoid CO2 retention of possible    Urinary retention in the hospital.  We will remove the Amador and monitor with postvoid residual bladder scans  twice a day for 4 times.  We will also start a course of Flomax 0.4 mg at bedtime for 2 weeks hopefully avoid further issues with retention               Electronically Signed By: Jovanni Light DO   4/30/24 11:56 AM

## 2024-04-30 NOTE — PROGRESS NOTES
Subjective   Reason for Visit: Christie Guadarrama is an 73 y.o. female here for a Medicare Wellness visit.      Also recently hospitalized for acute hypercapnic and hypoxic respiratory failure.  She ended up intubated in the intensive care unit.  Treated empirically with antibiotics.  Subsequently patient was able to be weaned and stabilized.  Prior to discharge she did develop urinary retention and Amador catheter was placed with the intention of Amador removal trial 5 days postdischarge    Medications orders clinical notes and labs were reviewed    Patient Care Team:  Jovanni Light DO as PCP - [employer]MO Medicare Advantage PCP  Gina Mendenhall RN as Care Manager (Case Management)     Review of Systems    Objective   Vitals:  /64   Pulse 87   Temp 36.4 °C (97.5 °F)   Wt 66.7 kg (147 lb)   SpO2 95%   BMI 28.71 kg/m²       Physical Exam  HEENT is grossly unremarkable.  The patient is in no acute distress.  She does appear somewhat weak and slightly dyspneic.  Using continuous O2.  Lungs are clear with diminished breath sounds  Heart rate and rhythm is regular.  Abdomen is soft nontender.  No peripheral edema.  Neurologically no focal deficits or tremors.  Amador catheter is in place and draining clear yellow urine.  There was some difficulty yesterday with leakage around the Amador however the Amador is very main draining appropriately and the patient requested we get it out as soon as feasible    Assessment/Plan   Status post hospitalization for acute on chronic hypoxic and hypercapnic respiratory failure.  Currently the patient appears to be stable and we will continue to monitor.  Since there is a risk of apnea we will ask the staff to maintain her O2 at the lowest flow rate to keep her above 90% to avoid CO2 retention of possible    Urinary retention in the hospital.  We will remove the Amador and monitor with postvoid residual bladder scans twice a day for 4 times.  We will also start a course of Flomax 0.4  mg at bedtime for 2 weeks hopefully avoid further issues with retention

## 2024-05-09 ENCOUNTER — NURSING HOME VISIT (OUTPATIENT)
Dept: POST ACUTE CARE | Facility: EXTERNAL LOCATION | Age: 74
End: 2024-05-09
Payer: MEDICARE

## 2024-05-09 DIAGNOSIS — I10 HYPERTENSION, UNSPECIFIED TYPE: ICD-10-CM

## 2024-05-09 DIAGNOSIS — J96.11 CHRONIC HYPOXIC RESPIRATORY FAILURE (MULTI): ICD-10-CM

## 2024-05-09 DIAGNOSIS — R33.8 ACUTE RETENTION OF URINE: ICD-10-CM

## 2024-05-09 DIAGNOSIS — K21.9 GASTROESOPHAGEAL REFLUX DISEASE, UNSPECIFIED WHETHER ESOPHAGITIS PRESENT: Primary | ICD-10-CM

## 2024-05-09 PROCEDURE — 99347 HOME/RES VST EST SF MDM 20: CPT | Performed by: INTERNAL MEDICINE

## 2024-05-09 NOTE — LETTER
Patient: Christie Guadarrama  : 1950    Encounter Date: 2024    Follow-up visit  Patient had the Amador removed several days ago and appears to be doing well and voiding without difficulty.    Reportedly had some questions of whether she needs to proton pump inhibitor pantoprazole.  She denies any symptoms of reflux heartburn indigestion.    Issues reported by staff or patient    Medications orders were reviewed.    Physical exam  Blood pressure is 102/62, recent blood pressure trends appear to be on the low normal side but otherwise was reasonably stable and the patient is asymptomatic.  Pulse is 91, pulse ox is 93% on O2.  Temp is 97.4.  Lung sounds are much improved with a few mid expiratory wheezes predominantly on the right.  Good breath sounds otherwise.  Heart rate and rhythm is regular.  No peripheral edema.  No tremors.    Assessment and care plan  Recent acute hypercapnic and hypoxic respiratory failure which required hospitalization appears to have stabilized and patient appears improved.  We will continue current regimen    She did have evidence of urinary retention in the hospital.  She is however voiding adequately now that the Amador has been removed.  We placed her on Flomax and we will continue this for a full 2 weeks and then discontinue.    Although she has a history of GERD she appears asymptomatic at this time we will decrease her pantoprazole from 40 to 20 mg daily.      Electronically Signed By: Jovanni Light DO   24  1:57 PM

## 2024-05-09 NOTE — PROGRESS NOTES
Follow-up visit  Patient had the Amador removed several days ago and appears to be doing well and voiding without difficulty.    Reportedly had some questions of whether she needs to proton pump inhibitor pantoprazole.  She denies any symptoms of reflux heartburn indigestion.    Issues reported by staff or patient    Medications orders were reviewed.    Physical exam  Blood pressure is 102/62, recent blood pressure trends appear to be on the low normal side but otherwise was reasonably stable and the patient is asymptomatic.  Pulse is 91, pulse ox is 93% on O2.  Temp is 97.4.  Lung sounds are much improved with a few mid expiratory wheezes predominantly on the right.  Good breath sounds otherwise.  Heart rate and rhythm is regular.  No peripheral edema.  No tremors.    Assessment and care plan  Recent acute hypercapnic and hypoxic respiratory failure which required hospitalization appears to have stabilized and patient appears improved.  We will continue current regimen    She did have evidence of urinary retention in the hospital.  She is however voiding adequately now that the Amador has been removed.  We placed her on Flomax and we will continue this for a full 2 weeks and then discontinue.    Although she has a history of GERD she appears asymptomatic at this time we will decrease her pantoprazole from 40 to 20 mg daily.

## 2024-05-29 ENCOUNTER — PATIENT OUTREACH (OUTPATIENT)
Dept: CARE COORDINATION | Facility: CLINIC | Age: 74
End: 2024-05-29
Payer: MEDICARE

## 2024-05-29 NOTE — PROGRESS NOTES
"Outreach call to patient to check in 30 days after hospital discharge to support smooth transition of care.  Message on phone number says \"phone number is disconnected or no longer in service. Will try again to follow up for this 30 day task.     Gina Khanna RN   Nurse Care Manager   [facility]   [phone]       "

## 2024-05-30 ENCOUNTER — PATIENT OUTREACH (OUTPATIENT)
Dept: CARE COORDINATION | Facility: CLINIC | Age: 74
End: 2024-05-30
Payer: MEDICARE

## 2024-05-30 NOTE — PROGRESS NOTES
Called again..number is disconnected or no longer in service. Will dis enroll.   Gina Khanna , RN   Nurse Care Manager   [facility]   [phone]

## 2024-06-13 ENCOUNTER — NURSING HOME VISIT (OUTPATIENT)
Dept: POST ACUTE CARE | Facility: EXTERNAL LOCATION | Age: 74
End: 2024-06-13
Payer: MEDICARE

## 2024-06-13 DIAGNOSIS — R91.1 LUNG NODULE: ICD-10-CM

## 2024-06-13 DIAGNOSIS — G47.00 INSOMNIA, UNSPECIFIED TYPE: ICD-10-CM

## 2024-06-13 DIAGNOSIS — J96.11 CHRONIC HYPOXIC RESPIRATORY FAILURE (MULTI): Primary | ICD-10-CM

## 2024-06-13 PROCEDURE — 99348 HOME/RES VST EST LOW MDM 30: CPT | Performed by: INTERNAL MEDICINE

## 2024-06-13 NOTE — LETTER
Patient: Christie Guadarrama  : 1950    Encounter Date: 2024    Follow-up visit  Patient states that she is having trouble sleeping.  She was asking about trazodone.  Some of her issues with sleeping are related to noise emanating from the residents room next-door at night    Patient also requests review of her O2 needs and wants to know if she needs to keep oxygen    Patient also states that the pulmonologist wants her to have a CAT scan of her chest to further evaluate her lung nodule.  She does not want this.  She is adamant about this that if there is something ominous there she does not want to explore it further and would just rather be managed symptomatically with comfort care.  She is again adamant she does not want any further workup about this    Medications and orders were reviewed.    Physical exam  Vital signs are stable the patient is afebrile.  Her pulse ox is 96% on 3 to 4 L of nasal cannula oxygen.  Lungs are clear.  Heart rate and rhythm is regular.  There is slight pedal edema.    Assessment and care plan  Rather than using trazodone we have advised starting with a trial of melatonin 5 mg at bedtime for sleep.  We have asked administration to address the noisy resident next-door to her.    Since she does have a history of CO2 retention we have asked the staff to reduce her O2 flow to the lowest required to keep her pulse ox greater than or equal to 90%.  It is possible with this approach that we may wean her off entirely but we will monitor    As far as the lung nodule is concerned, the patient is competent and again quite adamant that she does not want further workup.  We have asked the staff to notify the pulmonologist office that she refuses the CAT scan of her chest      Electronically Signed By: Jovanni Light DO   24 10:26 AM

## 2024-06-13 NOTE — PROGRESS NOTES
Follow-up visit  Patient states that she is having trouble sleeping.  She was asking about trazodone.  Some of her issues with sleeping are related to noise emanating from the residents room next-door at night    Patient also requests review of her O2 needs and wants to know if she needs to keep oxygen    Patient also states that the pulmonologist wants her to have a CAT scan of her chest to further evaluate her lung nodule.  She does not want this.  She is adamant about this that if there is something ominous there she does not want to explore it further and would just rather be managed symptomatically with comfort care.  She is again adamant she does not want any further workup about this    Medications and orders were reviewed.    Physical exam  Vital signs are stable the patient is afebrile.  Her pulse ox is 96% on 3 to 4 L of nasal cannula oxygen.  Lungs are clear.  Heart rate and rhythm is regular.  There is slight pedal edema.    Assessment and care plan  Rather than using trazodone we have advised starting with a trial of melatonin 5 mg at bedtime for sleep.  We have asked administration to address the noisy resident next-door to her.    Since she does have a history of CO2 retention we have asked the staff to reduce her O2 flow to the lowest required to keep her pulse ox greater than or equal to 90%.  It is possible with this approach that we may wean her off entirely but we will monitor    As far as the lung nodule is concerned, the patient is competent and again quite adamant that she does not want further workup.  We have asked the staff to notify the pulmonologist office that she refuses the CAT scan of her chest

## 2024-06-18 ENCOUNTER — NURSING HOME VISIT (OUTPATIENT)
Dept: POST ACUTE CARE | Facility: EXTERNAL LOCATION | Age: 74
End: 2024-06-18
Payer: MEDICARE

## 2024-06-18 DIAGNOSIS — J96.11 CHRONIC HYPOXIC RESPIRATORY FAILURE (MULTI): ICD-10-CM

## 2024-06-18 DIAGNOSIS — R60.9 EDEMA, UNSPECIFIED TYPE: ICD-10-CM

## 2024-06-18 DIAGNOSIS — J43.8 OTHER EMPHYSEMA (MULTI): Primary | ICD-10-CM

## 2024-06-18 DIAGNOSIS — R91.1 LUNG NODULE: ICD-10-CM

## 2024-06-18 PROCEDURE — 99348 HOME/RES VST EST LOW MDM 30: CPT | Performed by: INTERNAL MEDICINE

## 2024-06-18 NOTE — LETTER
Patient: Christie Guadarrama  : 1950    Encounter Date: 2024    Follow-up visit  Patient has to be seen due to increased swelling of her legs.  No other issues reported by staff.  Patient reports that the breathing overall his is stable still using her oxygen as required.  There is been no increased shortness of breath or coughing.    Medications orders were reviewed.  Patient's lab work in the hospital was reviewed    Physical exam  Blood pressure is 132/74 temps 97 1 pulse is 72 pulse ox is 96% on low-flow nasal cannula oxygen  Lungs demonstrate diminished breath sounds with occasional faint wheeze.  No definite crackles are heard  Heart rate rhythm is regular  Legs demonstrate 2+ pitting edema of the left leg and less so on the right but the right leg has a compression stocking.  Patient states she was unable to place the compression stocking on the left leg this morning    Assessment and care plan  Increasing edema.  This is likely secondary to respiratory status impact on her cardiovascular system.  We will add torsemide 10 mg daily and monitor along with some potassium 10 mEq daily to avoid hypokalemia.  We will continue to monitor her progress and adjust diuretics as needed.    In addition to the above we will increase her Trelegy Ellipta to 100/62.5/25 1 puff daily in hopes that increased steroid will help with some of the bronchospasm.  We will therefore decrease her DuoNebs to 4 times a day only as needed since she is on a regular dosing of Trelegy which also has a long-acting beta-blocker.  Will continue to monitor her progress.    The situation persists or worsen we may obtain an echocardiogram to further assess her ventricular function      Electronically Signed By: Jovanni Light DO   24  3:29 PM

## 2024-06-18 NOTE — PROGRESS NOTES
Follow-up visit  Patient has to be seen due to increased swelling of her legs.  No other issues reported by staff.  Patient reports that the breathing overall his is stable still using her oxygen as required.  There is been no increased shortness of breath or coughing.    Medications orders were reviewed.  Patient's lab work in the hospital was reviewed    Physical exam  Blood pressure is 132/74 temps 97 1 pulse is 72 pulse ox is 96% on low-flow nasal cannula oxygen  Lungs demonstrate diminished breath sounds with occasional faint wheeze.  No definite crackles are heard  Heart rate rhythm is regular  Legs demonstrate 2+ pitting edema of the left leg and less so on the right but the right leg has a compression stocking.  Patient states she was unable to place the compression stocking on the left leg this morning    Assessment and care plan  Increasing edema.  This is likely secondary to respiratory status impact on her cardiovascular system.  We will add torsemide 10 mg daily and monitor along with some potassium 10 mEq daily to avoid hypokalemia.  We will continue to monitor her progress and adjust diuretics as needed.    In addition to the above we will increase her Trelegy Ellipta to 100/62.5/25 1 puff daily in hopes that increased steroid will help with some of the bronchospasm.  We will therefore decrease her DuoNebs to 4 times a day only as needed since she is on a regular dosing of Trelegy which also has a long-acting beta-blocker.  Will continue to monitor her progress.    The situation persists or worsen we may obtain an echocardiogram to further assess her ventricular function

## 2024-07-09 ENCOUNTER — NURSING HOME VISIT (OUTPATIENT)
Dept: POST ACUTE CARE | Facility: EXTERNAL LOCATION | Age: 74
End: 2024-07-09
Payer: MEDICARE

## 2024-07-09 DIAGNOSIS — J96.11 CHRONIC HYPOXIC RESPIRATORY FAILURE (MULTI): ICD-10-CM

## 2024-07-09 DIAGNOSIS — R60.9 EDEMA, UNSPECIFIED TYPE: ICD-10-CM

## 2024-07-09 DIAGNOSIS — J43.8 OTHER EMPHYSEMA (MULTI): Primary | ICD-10-CM

## 2024-07-09 PROCEDURE — 99348 HOME/RES VST EST LOW MDM 30: CPT | Performed by: INTERNAL MEDICINE

## 2024-07-09 NOTE — PROGRESS NOTES
Follow-up visit  Patient concerned that the swelling in her feet persists and is worse.  No change in her respiratory status per her report    Medications and orders were reviewed.  Prior notes were reviewed.    Physical exam  Blood pressure is 125/74, pulse is 72, temp is 97.7, pulse ox is 98% on room air  HEENT is grossly unremarkable patient is in no apparent distress.  Her legs demonstrate 2-3+ edema on the right.  The left leg demonstrates at least 3+ edema or more with slight tenderness.  The edema in both legs is almost up to the thighs.    Assessment and care plan  Given the patient's other multiple medical problems we are concerned about either an exacerbation of heart failure possibly due to pulmonary hypertension or DVTs.  We we will increase her torsemide to 20 mg daily.  However we will also obtain bilateral venous duplex scans and echocardiogram to address the above concerns

## 2024-07-09 NOTE — LETTER
Patient: Christie Guadarrama  : 1950    Encounter Date: 2024    Follow-up visit  Patient concerned that the swelling in her feet persists and is worse.  No change in her respiratory status per her report    Medications and orders were reviewed.  Prior notes were reviewed.    Physical exam  Blood pressure is 125/74, pulse is 72, temp is 97.7, pulse ox is 98% on room air  HEENT is grossly unremarkable patient is in no apparent distress.  Her legs demonstrate 2-3+ edema on the right.  The left leg demonstrates at least 3+ edema or more with slight tenderness.  The edema in both legs is almost up to the thighs.    Assessment and care plan  Given the patient's other multiple medical problems we are concerned about either an exacerbation of heart failure possibly due to pulmonary hypertension or DVTs.  We we will increase her torsemide to 20 mg daily.  However we will also obtain bilateral venous duplex scans and echocardiogram to address the above concerns      Electronically Signed By: Jovanni Light DO   24  9:47 AM

## 2024-07-18 ENCOUNTER — HOSPITAL ENCOUNTER (OUTPATIENT)
Dept: VASCULAR MEDICINE | Facility: CLINIC | Age: 74
Discharge: HOME | End: 2024-07-18
Payer: MEDICARE

## 2024-07-18 DIAGNOSIS — I82.409 DVT (DEEP VENOUS THROMBOSIS) (MULTI): ICD-10-CM

## 2024-07-18 PROCEDURE — 93970 EXTREMITY STUDY: CPT

## 2024-07-18 PROCEDURE — 93970 EXTREMITY STUDY: CPT | Performed by: SURGERY

## 2024-08-08 ENCOUNTER — APPOINTMENT (OUTPATIENT)
Dept: CARDIOLOGY | Facility: CLINIC | Age: 74
End: 2024-08-08
Payer: MEDICARE

## 2024-08-30 ENCOUNTER — HOSPITAL ENCOUNTER (OUTPATIENT)
Dept: CARDIOLOGY | Facility: CLINIC | Age: 74
Discharge: HOME | End: 2024-08-30
Payer: MEDICARE

## 2024-08-30 DIAGNOSIS — I50.22 CHRONIC SYSTOLIC (CONGESTIVE) HEART FAILURE (MULTI): ICD-10-CM

## 2024-08-30 LAB
AORTIC VALVE PEAK VELOCITY: 1.9 M/S
AV PEAK GRADIENT: 14.5 MMHG
EJECTION FRACTION APICAL 4 CHAMBER: 82.2
EJECTION FRACTION: 78 %
LEFT ATRIUM VOLUME AREA LENGTH INDEX BSA: 18.7 ML/M2
MITRAL VALVE E/A RATIO: 1.01
RIGHT VENTRICLE FREE WALL PEAK S': 10 CM/S
TRICUSPID ANNULAR PLANE SYSTOLIC EXCURSION: 1.7 CM

## 2024-08-30 PROCEDURE — 93306 TTE W/DOPPLER COMPLETE: CPT

## 2024-08-30 PROCEDURE — 93306 TTE W/DOPPLER COMPLETE: CPT | Performed by: INTERNAL MEDICINE

## 2024-09-03 ENCOUNTER — NURSING HOME VISIT (OUTPATIENT)
Dept: POST ACUTE CARE | Facility: EXTERNAL LOCATION | Age: 74
End: 2024-09-03
Payer: MEDICARE

## 2024-09-03 DIAGNOSIS — J96.11 CHRONIC HYPOXIC RESPIRATORY FAILURE (MULTI): Primary | ICD-10-CM

## 2024-09-03 DIAGNOSIS — I50.32 CHRONIC DIASTOLIC CONGESTIVE HEART FAILURE (MULTI): ICD-10-CM

## 2024-09-03 PROCEDURE — 99348 HOME/RES VST EST LOW MDM 30: CPT | Performed by: INTERNAL MEDICINE

## 2024-09-03 NOTE — LETTER
Patient: Christie Guadarrama  : 1950    Encounter Date: 2024    Follow-up visit  Patient seen today in follow-up to her echocardiographic study which demonstrated hyperdynamic left ventricle with a degree of mid left ventricular outflow obstruction.  Patient does report that she still has some persistent edema.    Medications and orders were reviewed with the patient.  We discussed the results of the echocardiogram and explained to her that this may cause some degree of stiff heart that may aggravate some of her shortness of breath and edema.    Review of systems  No other issues reported.  There has been no significant changes in her overall respiratory status or increased shortness of breath other than her usual baseline.  No postural lightheadedness or dizziness    Physical exam  Blood pressure is 115/68, temp 97.9, pulse is 72, pulse ox is 97% with O2  HEENT is grossly unremarkable.  The patient is in no acute distress.  Lungs demonstrate diminished breath sounds with faint wheezes.  Heart rate and rhythm is regular  Extremities demonstrate 1+ pedal edema    Assessment and care plan  Chronic COPD with chronic respiratory failure but appears to be stable in pattern and she continues to maintain on O2.  Echocardiogram suggest that some of her issues may be from a form of diastolic heart failure from a hyperdynamic ventricle with a degree of outflow tract obstruction.  There has been no syncope or lightheadedness or collapse.  We will increase her metoprolol succinate from 25 to 50 mg daily in hopes of improving her cardiac function as well as some of her peripheral edema and shortness of breath      Electronically Signed By: Jovanni Light DO   9/3/24 10:12 AM

## 2024-09-03 NOTE — PROGRESS NOTES
Follow-up visit  Patient seen today in follow-up to her echocardiographic study which demonstrated hyperdynamic left ventricle with a degree of mid left ventricular outflow obstruction.  Patient does report that she still has some persistent edema.    Medications and orders were reviewed with the patient.  We discussed the results of the echocardiogram and explained to her that this may cause some degree of stiff heart that may aggravate some of her shortness of breath and edema.    Review of systems  No other issues reported.  There has been no significant changes in her overall respiratory status or increased shortness of breath other than her usual baseline.  No postural lightheadedness or dizziness    Physical exam  Blood pressure is 115/68, temp 97.9, pulse is 72, pulse ox is 97% with O2  HEENT is grossly unremarkable.  The patient is in no acute distress.  Lungs demonstrate diminished breath sounds with faint wheezes.  Heart rate and rhythm is regular  Extremities demonstrate 1+ pedal edema    Assessment and care plan  Chronic COPD with chronic respiratory failure but appears to be stable in pattern and she continues to maintain on O2.  Echocardiogram suggest that some of her issues may be from a form of diastolic heart failure from a hyperdynamic ventricle with a degree of outflow tract obstruction.  There has been no syncope or lightheadedness or collapse.  We will increase her metoprolol succinate from 25 to 50 mg daily in hopes of improving her cardiac function as well as some of her peripheral edema and shortness of breath

## 2024-09-26 ENCOUNTER — NURSING HOME VISIT (OUTPATIENT)
Dept: POST ACUTE CARE | Facility: EXTERNAL LOCATION | Age: 74
End: 2024-09-26
Payer: MEDICARE

## 2024-09-26 DIAGNOSIS — J96.11 CHRONIC HYPOXIC RESPIRATORY FAILURE (MULTI): Primary | ICD-10-CM

## 2024-09-26 DIAGNOSIS — J44.1 ACUTE EXACERBATION OF CHRONIC OBSTRUCTIVE PULMONARY DISEASE (COPD) (MULTI): ICD-10-CM

## 2024-09-26 PROCEDURE — 99348 HOME/RES VST EST LOW MDM 30: CPT | Performed by: INTERNAL MEDICINE

## 2024-09-26 NOTE — PROGRESS NOTES
Follow-up visit  Patient was sent to the emergency room a couple days ago due to increased shortness of breath and drop in her pulse ox with hypoxia.  She was seen evaluated emergency room and treated for an acute exacerbation of COPD with respiratory treatments and started on Medrol Dosepak along with Z-Chris.  She returned yesterday.  Today she states she feels a bit better.    Medications and orders were reviewed.    Physical exam  Blood pressure is 130/74, temp is 97.8, pulse of 69, pulse ox is 99% on O2 Pittore rate is 17  HEENT is grossly unremarkable she is alert pleasant no apparent distress  Lungs demonstrate diminished breath sounds bilaterally with faint mid expiratory wheezing.  Heart rate and rhythm is regular.  There is slight to 1+ pedal edema    Assessment and care plan  Acute exacerbation of COPD with hypoxia on top of her chronic COPD with chronic respiratory failure.  Currently she appears to be reasonably stable.  We will complete the Medrol Dosepak along with the antibiotic.  We will add DuoNeb aerosol treatments 3 times a day and every 4 hours as needed.  This will be adjusted further pending the patient's progress

## 2024-09-26 NOTE — LETTER
Patient: Christie Guadarrama  : 1950    Encounter Date: 2024    Follow-up visit  Patient was sent to the emergency room a couple days ago due to increased shortness of breath and drop in her pulse ox with hypoxia.  She was seen evaluated emergency room and treated for an acute exacerbation of COPD with respiratory treatments and started on Medrol Dosepak along with Z-Chris.  She returned yesterday.  Today she states she feels a bit better.    Medications and orders were reviewed.    Physical exam  Blood pressure is 130/74, temp is 97.8, pulse of 69, pulse ox is 99% on O2 Pittore rate is 17  HEENT is grossly unremarkable she is alert pleasant no apparent distress  Lungs demonstrate diminished breath sounds bilaterally with faint mid expiratory wheezing.  Heart rate and rhythm is regular.  There is slight to 1+ pedal edema    Assessment and care plan  Acute exacerbation of COPD with hypoxia on top of her chronic COPD with chronic respiratory failure.  Currently she appears to be reasonably stable.  We will complete the Medrol Dosepak along with the antibiotic.  We will add DuoNeb aerosol treatments 3 times a day and every 4 hours as needed.  This will be adjusted further pending the patient's progress      Electronically Signed By: Jovanni Light DO   24 11:53 AM

## 2024-12-09 ENCOUNTER — NURSING HOME VISIT (OUTPATIENT)
Dept: POST ACUTE CARE | Facility: EXTERNAL LOCATION | Age: 74
End: 2024-12-09
Payer: MEDICARE

## 2024-12-09 DIAGNOSIS — L03.116 CELLULITIS OF LEFT LOWER LEG: ICD-10-CM

## 2024-12-09 DIAGNOSIS — J96.11 CHRONIC HYPOXIC RESPIRATORY FAILURE (MULTI): Primary | ICD-10-CM

## 2024-12-09 PROCEDURE — 99348 HOME/RES VST EST LOW MDM 30: CPT | Performed by: INTERNAL MEDICINE

## 2024-12-09 NOTE — LETTER
Patient: Christie Guadarrama  : 1950    Encounter Date: 2024    Follow-up visit  Patient was recently sent to the hospital for concern of a swollen left leg that was red and warm with some shallow skin breakdown.  DVT was ruled out at the emergency room and the patient was treated for cellulitis and sent back on doxycycline 100 mg twice a day for 5 days.  In addition they felt that she had an exacerbation of her chronic respiratory failure    Medications orders and hospital clinical notes were reviewed.    Review of systems  Patient reports that the left leg still hurts a bit.  She has no respiratory complaints at the moment.  Appears to be back at her baseline  The patient did complain about the BiPAP machine in her room.  She insists that she cannot use it that she was not able to tolerate it in the hospital and will not use that here in the facility and wants it removed.  She was quite adamant about this    Physical exam  Vital signs appear to be stable and afebrile.  Pulse ox is 98% on O2.  HEENT is grossly unremarkable.  Lungs are clear to auscultation with diminished breath sounds.  Heart rate and rhythm is regular.  There is mild bilateral peripheral edema slightly more so on the left leg.  Mild erythema to the lower extremity and no drainage noted.    Assessment and care plan  Cellulitis of the left leg currently on antibiotics and appears to be gradually improving.  We will continue to monitor and complete the course of antibiotics.    The patient has chronic hypoxic respiratory failure.  She has been prescribed BiPAP at night but refuses to use this and is adamant that she wants it removed from her room so we will do so.  This has been discussed with the patient's sister who is also POA and she is also in agreement of removing the BiPAP machine.  The patient was placed on prednisone in the hospital because of since that she had an acute exacerbation of her COPD.  We will gradually wean this off over  the next couple of weeks.    She does have trouble with anxiety and sleeping at night she is currently on low-dose trazodone Seroquel and sertraline latter 2 have recently been added and the patient appears to be tolerating this well and appears to be helping somewhat according to the patient's sister.  However she is also on Remeron likely for appetite stimulant but we do not feel that this is of significant benefit or needed at this time and we will wean her off of that.  This was also discussed with the POA      Electronically Signed By: Jovanni Light DO   12/9/24  1:20 PM

## 2024-12-09 NOTE — PROGRESS NOTES
Follow-up visit  Patient was recently sent to the hospital for concern of a swollen left leg that was red and warm with some shallow skin breakdown.  DVT was ruled out at the emergency room and the patient was treated for cellulitis and sent back on doxycycline 100 mg twice a day for 5 days.  In addition they felt that she had an exacerbation of her chronic respiratory failure    Medications orders and hospital clinical notes were reviewed.    Review of systems  Patient reports that the left leg still hurts a bit.  She has no respiratory complaints at the moment.  Appears to be back at her baseline  The patient did complain about the BiPAP machine in her room.  She insists that she cannot use it that she was not able to tolerate it in the hospital and will not use that here in the facility and wants it removed.  She was quite adamant about this    Physical exam  Vital signs appear to be stable and afebrile.  Pulse ox is 98% on O2.  HEENT is grossly unremarkable.  Lungs are clear to auscultation with diminished breath sounds.  Heart rate and rhythm is regular.  There is mild bilateral peripheral edema slightly more so on the left leg.  Mild erythema to the lower extremity and no drainage noted.    Assessment and care plan  Cellulitis of the left leg currently on antibiotics and appears to be gradually improving.  We will continue to monitor and complete the course of antibiotics.    The patient has chronic hypoxic respiratory failure.  She has been prescribed BiPAP at night but refuses to use this and is adamant that she wants it removed from her room so we will do so.  This has been discussed with the patient's sister who is also POA and she is also in agreement of removing the BiPAP machine.  The patient was placed on prednisone in the hospital because of since that she had an acute exacerbation of her COPD.  We will gradually wean this off over the next couple of weeks.    She does have trouble with anxiety and  sleeping at night she is currently on low-dose trazodone Seroquel and sertraline latter 2 have recently been added and the patient appears to be tolerating this well and appears to be helping somewhat according to the patient's sister.  However she is also on Remeron likely for appetite stimulant but we do not feel that this is of significant benefit or needed at this time and we will wean her off of that.  This was also discussed with the POA

## 2025-01-28 ENCOUNTER — NURSING HOME VISIT (OUTPATIENT)
Dept: POST ACUTE CARE | Facility: EXTERNAL LOCATION | Age: 75
End: 2025-01-28
Payer: MEDICARE

## 2025-01-28 DIAGNOSIS — G47.00 INSOMNIA, UNSPECIFIED TYPE: Primary | ICD-10-CM

## 2025-01-28 DIAGNOSIS — J96.11 CHRONIC HYPOXIC RESPIRATORY FAILURE (MULTI): ICD-10-CM

## 2025-01-28 PROCEDURE — 99347 HOME/RES VST EST SF MDM 20: CPT | Performed by: INTERNAL MEDICINE

## 2025-01-28 NOTE — PROGRESS NOTES
Follow-up visit  Patient seen today because of complaints of insomnia.  Patient reports that she falls asleep okay but wakes up after couple of hours and cannot fall back to sleep.    Medications and orders are reviewed.  Currently she is on trazodone 50 mg at bedtime, sertraline 25 mg at bedtime and Seroquel 12.5 mg at bedtime from her recent hospital stay.    Review of systems  As far as her respiratory status is concerned this appears to be stable she has no new complaints and states that her breathing has been okay    Physical exam  Vital signs are stable the patient is afebrile  Lungs are clear with diminished breath sounds heart rate and rhythm is regular  Pulse ox is 97% on O2.    Assessment and care plan  Insomnia.  We will increase her trazodone to 100 mg at bedtime.  Continue the sertraline.  Since she is on such a low-dose of Seroquel and we want to avoid polypharmacy we will discontinue this and monitor    As far as her respiratory status is concerned it appears stable and there is no changes in management at this time

## 2025-01-28 NOTE — LETTER
Patient: Christie Guadarrama  : 1950    Encounter Date: 2025    Follow-up visit  Patient seen today because of complaints of insomnia.  Patient reports that she falls asleep okay but wakes up after couple of hours and cannot fall back to sleep.    Medications and orders are reviewed.  Currently she is on trazodone 50 mg at bedtime, sertraline 25 mg at bedtime and Seroquel 12.5 mg at bedtime from her recent hospital stay.    Review of systems  As far as her respiratory status is concerned this appears to be stable she has no new complaints and states that her breathing has been okay    Physical exam  Vital signs are stable the patient is afebrile  Lungs are clear with diminished breath sounds heart rate and rhythm is regular  Pulse ox is 97% on O2.    Assessment and care plan  Insomnia.  We will increase her trazodone to 100 mg at bedtime.  Continue the sertraline.  Since she is on such a low-dose of Seroquel and we want to avoid polypharmacy we will discontinue this and monitor    As far as her respiratory status is concerned it appears stable and there is no changes in management at this time      Electronically Signed By: Jovanni Light DO   25 12:38 PM

## 2025-02-04 ENCOUNTER — NURSING HOME VISIT (OUTPATIENT)
Dept: POST ACUTE CARE | Facility: EXTERNAL LOCATION | Age: 75
End: 2025-02-04
Payer: MEDICARE

## 2025-02-04 DIAGNOSIS — L03.116 CELLULITIS AND ABSCESS OF LEFT LEG: Primary | ICD-10-CM

## 2025-02-04 DIAGNOSIS — R60.0 EDEMA OF LEFT LOWER LEG: ICD-10-CM

## 2025-02-04 DIAGNOSIS — L02.416 CELLULITIS AND ABSCESS OF LEFT LEG: Primary | ICD-10-CM

## 2025-02-04 PROCEDURE — 99348 HOME/RES VST EST LOW MDM 30: CPT | Performed by: INTERNAL MEDICINE

## 2025-02-04 NOTE — PROGRESS NOTES
Follow-up visit  Patient seen today in follow-up to some noticed edema that had increased in both legs upon her last visit.  She also noticed some increased redness to the left leg and she does have a history of cellulitis so at that time we placed her on doxycycline 100 mg twice a day for approximately a week.  Today we are rechecking.    Medications and orders are reviewed.    Review of systems  Patient has no specific complaints.    Physical exam  Pulse is 70 temp 97.7 pulse ox is 99% on O2.  Blood pressure is 147/82.  She is in no apparent distress she is alert and oriented x 3.  Redness of the left leg noted last week is significantly improved.  She is completing a course of doxycycline.  We did increase her Lasix and the edema appears to be better in the right leg but the left leg appears to be still swollen and particularly around the calf area.  No marked tenderness or warmth.    Assessment and care plan  Persistent edema of the legs which is asymptomatic and suspicious for DVT of the left leg.  Her bilateral edema is certainly secondary to believe to a component of her respiratory issues as well as cardiac.  However the asymmetry again is suspicious for DVT.  Will obtain venous duplex scan of the left leg.  There does not appear to be any further signs of cellulitis.  No significant redness or warmth as stated above is noted.  We will continue to monitor.

## 2025-02-04 NOTE — LETTER
Patient: Christie Guadarrama  : 1950    Encounter Date: 2025    Follow-up visit  Patient seen today in follow-up to some noticed edema that had increased in both legs upon her last visit.  She also noticed some increased redness to the left leg and she does have a history of cellulitis so at that time we placed her on doxycycline 100 mg twice a day for approximately a week.  Today we are rechecking.    Medications and orders are reviewed.    Review of systems  Patient has no specific complaints.    Physical exam  Pulse is 70 temp 97.7 pulse ox is 99% on O2.  Blood pressure is 147/82.  She is in no apparent distress she is alert and oriented x 3.  Redness of the left leg noted last week is significantly improved.  She is completing a course of doxycycline.  We did increase her Lasix and the edema appears to be better in the right leg but the left leg appears to be still swollen and particularly around the calf area.  No marked tenderness or warmth.    Assessment and care plan  Persistent edema of the legs which is asymptomatic and suspicious for DVT of the left leg.  Her bilateral edema is certainly secondary to believe to a component of her respiratory issues as well as cardiac.  However the asymmetry again is suspicious for DVT.  Will obtain venous duplex scan of the left leg.  There does not appear to be any further signs of cellulitis.  No significant redness or warmth as stated above is noted.  We will continue to monitor.      Electronically Signed By: Jovanni Light DO   25  1:44 PM

## 2025-02-07 ENCOUNTER — HOSPITAL ENCOUNTER (OUTPATIENT)
Dept: CARDIOLOGY | Facility: HOSPITAL | Age: 75
Discharge: HOME | End: 2025-02-07
Payer: MEDICARE

## 2025-02-07 ENCOUNTER — ANCILLARY ORDERS (OUTPATIENT)
Dept: CARDIOLOGY | Facility: HOSPITAL | Age: 75
End: 2025-02-07
Payer: MEDICARE

## 2025-02-07 ENCOUNTER — HOSPITAL ENCOUNTER (OUTPATIENT)
Dept: CARDIOLOGY | Facility: HOSPITAL | Age: 75
End: 2025-02-07
Payer: MEDICARE

## 2025-02-07 DIAGNOSIS — Z86.718 PERSONAL HISTORY OF OTHER VENOUS THROMBOSIS AND EMBOLISM: ICD-10-CM

## 2025-02-07 DIAGNOSIS — R60.1 GENERALIZED EDEMA: ICD-10-CM

## 2025-02-07 DIAGNOSIS — M79.89 SWELLING OF LIMB: ICD-10-CM

## 2025-02-07 DIAGNOSIS — O22.30 DVT (DEEP VEIN THROMBOSIS) IN PREGNANCY (HHS-HCC): ICD-10-CM

## 2025-02-07 DIAGNOSIS — L03.116 CELLULITIS OF LEFT LOWER LIMB: ICD-10-CM

## 2025-02-07 DIAGNOSIS — M79.89 SWELLING OF LIMB: Primary | ICD-10-CM

## 2025-02-07 PROCEDURE — 93971 EXTREMITY STUDY: CPT | Performed by: INTERNAL MEDICINE

## 2025-02-07 PROCEDURE — 93971 EXTREMITY STUDY: CPT

## 2025-02-10 ENCOUNTER — NURSING HOME VISIT (OUTPATIENT)
Dept: POST ACUTE CARE | Facility: EXTERNAL LOCATION | Age: 75
End: 2025-02-10
Payer: MEDICARE

## 2025-02-10 DIAGNOSIS — R60.9 EDEMA, UNSPECIFIED TYPE: Primary | ICD-10-CM

## 2025-02-10 DIAGNOSIS — R19.7 DIARRHEA, UNSPECIFIED TYPE: ICD-10-CM

## 2025-02-10 PROCEDURE — 99347 HOME/RES VST EST SF MDM 20: CPT | Performed by: INTERNAL MEDICINE

## 2025-02-10 NOTE — PROGRESS NOTES
Follow-up visit  Patient seen in follow-up to the swelling of her legs particularly the asymmetric increased left leg swelling.  Venous duplex scan was done and was negative for DVT.  Patient states that the swelling is better on the increased diuretic therapy with no pain.    There has been some reported loose stools at times.  Stool samples for C. difficile and other bacteria were ordered but the patient refused and states that she does not have diarrhea.  On occasion she may have a soft loose stool but does not want to be bothered with collecting stool samples and does not feel sick or have frequent watery stools.    Physical exam  Vital signs are stable the patient is afebrile she is in no apparent distress.  Her pulse ox is 95% on O2.  Leg edema appears to be improved.    Assessment and care plan  Leg edema is improved.  Venous duplex is negative for DVT.  We will continue with the increased diuretic dosing.  Repeat BMP has been ordered for about a week from now.    Loose stools appear to be functional and not infectious etiology at this point and appear to be minimal and infrequent according to the patient.  We will continue to monitor

## 2025-02-10 NOTE — LETTER
Patient: Christie Guadarrama  : 1950    Encounter Date: 02/10/2025    Follow-up visit  Patient seen in follow-up to the swelling of her legs particularly the asymmetric increased left leg swelling.  Venous duplex scan was done and was negative for DVT.  Patient states that the swelling is better on the increased diuretic therapy with no pain.    There has been some reported loose stools at times.  Stool samples for C. difficile and other bacteria were ordered but the patient refused and states that she does not have diarrhea.  On occasion she may have a soft loose stool but does not want to be bothered with collecting stool samples and does not feel sick or have frequent watery stools.    Physical exam  Vital signs are stable the patient is afebrile she is in no apparent distress.  Her pulse ox is 95% on O2.  Leg edema appears to be improved.    Assessment and care plan  Leg edema is improved.  Venous duplex is negative for DVT.  We will continue with the increased diuretic dosing.  Repeat BMP has been ordered for about a week from now.    Loose stools appear to be functional and not infectious etiology at this point and appear to be minimal and infrequent according to the patient.  We will continue to monitor      Electronically Signed By: Jovanni Light DO   2/10/25 10:26 AM

## 2025-03-05 ENCOUNTER — NURSING HOME VISIT (OUTPATIENT)
Dept: POST ACUTE CARE | Facility: EXTERNAL LOCATION | Age: 75
End: 2025-03-05
Payer: MEDICARE

## 2025-03-05 DIAGNOSIS — E88.09 HYPOALBUMINEMIA DUE TO PROTEIN-CALORIE MALNUTRITION (MULTI): ICD-10-CM

## 2025-03-05 DIAGNOSIS — J84.10 POSTINFLAMMATORY PULMONARY FIBROSIS (MULTI): ICD-10-CM

## 2025-03-05 DIAGNOSIS — R60.9 EDEMA, UNSPECIFIED TYPE: ICD-10-CM

## 2025-03-05 DIAGNOSIS — J43.8 OTHER EMPHYSEMA (MULTI): ICD-10-CM

## 2025-03-05 DIAGNOSIS — K21.9 GASTROESOPHAGEAL REFLUX DISEASE, UNSPECIFIED WHETHER ESOPHAGITIS PRESENT: ICD-10-CM

## 2025-03-05 DIAGNOSIS — E46 HYPOALBUMINEMIA DUE TO PROTEIN-CALORIE MALNUTRITION (MULTI): ICD-10-CM

## 2025-03-05 DIAGNOSIS — I50.32 CHRONIC DIASTOLIC CONGESTIVE HEART FAILURE: ICD-10-CM

## 2025-03-05 DIAGNOSIS — I10 HYPERTENSION, UNSPECIFIED TYPE: ICD-10-CM

## 2025-03-05 DIAGNOSIS — R91.1 LUNG NODULE: ICD-10-CM

## 2025-03-05 DIAGNOSIS — J96.11 CHRONIC HYPOXIC RESPIRATORY FAILURE (MULTI): Primary | ICD-10-CM

## 2025-03-05 PROCEDURE — 99344 HOME/RES VST NEW MOD MDM 60: CPT | Performed by: STUDENT IN AN ORGANIZED HEALTH CARE EDUCATION/TRAINING PROGRAM

## 2025-03-05 NOTE — LETTER
Patient: Christie Guadarrama  : 1950    Encounter Date: 2025    Subjective  Patient ID: Christie Guadarrama is a 74 y.o. female.    Pt is a 74 year old female with pmhx of mdd, copd, chronic hypoxia, dvt, pulm fibrosis, dysphagia, who resides at long term care facility in [place]. Regards that she is overall doing well without any acute issues. States her oxygenation is overall stable and was having some issues with fluid overaloaded but this has resolved. Otherwise regards no acute issues or concerns at this time.          Review of Systems   Constitutional:  Positive for fatigue.   HENT: Negative.     Respiratory:  Positive for cough and shortness of breath.    Cardiovascular: Negative.    Gastrointestinal: Negative.    Musculoskeletal: Negative.    Neurological:  Positive for weakness.   Psychiatric/Behavioral: Negative.         ObjectiveVitals Reviewed via facility EMR   Physical Exam  Constitutional:       General: She is not in acute distress.     Appearance: She is ill-appearing.      Comments: On o2, stable   Eyes:      Pupils: Pupils are equal, round, and reactive to light.   Cardiovascular:      Rate and Rhythm: Normal rate and regular rhythm.      Pulses: Normal pulses.      Heart sounds: No murmur heard.  Pulmonary:      Effort: No respiratory distress.      Breath sounds: No wheezing.   Abdominal:      General: Abdomen is flat. Bowel sounds are normal. There is no distension.   Musculoskeletal:      Right lower leg: No edema.      Left lower leg: No edema.   Skin:     General: Skin is warm and dry.   Neurological:      Mental Status: She is alert. Mental status is at baseline.      Cranial Nerves: No cranial nerve deficit.      Motor: Weakness present.   Psychiatric:         Mood and Affect: Mood normal.         Behavior: Behavior normal.         Assessment/Plan  Diagnoses and all orders for this visit:  Chronic hypoxic respiratory failure (Multi)  Chronic diastolic congestive heart failure  Postinflammatory  pulmonary fibrosis (Multi)  Lung nodule  Other emphysema (Multi)  Hypoalbuminemia due to protein-calorie malnutrition (Multi)  Edema, unspecified type  Gastroesophageal reflux disease, unspecified whether esophagitis present  Hypertension, unspecified type    Patient seen and examined at bedside. Medications reviewed and reconciled. Did have some fluid swelling of the LE however this has resolved. Otherwise continue supportive care. Closely monitor respiratory status. Medicaitons reviwed and reconciled     Reviewed and approved by ART LEPE on 3/6/25 at 10:12 PM.       Electronically Signed By: Art Lepe DO   3/6/25 10:12 PM

## 2025-03-06 PROBLEM — C50.912 BREAST CANCER, LEFT (MULTI): Status: RESOLVED | Noted: 2023-06-13 | Resolved: 2025-03-06

## 2025-03-06 ASSESSMENT — ENCOUNTER SYMPTOMS
GASTROINTESTINAL NEGATIVE: 1
FATIGUE: 1
PSYCHIATRIC NEGATIVE: 1
MUSCULOSKELETAL NEGATIVE: 1
SHORTNESS OF BREATH: 1
COUGH: 1
WEAKNESS: 1
CARDIOVASCULAR NEGATIVE: 1

## 2025-03-07 NOTE — PROGRESS NOTES
Subjective   Patient ID: Christie Guadarrama is a 74 y.o. female.    Pt is a 74 year old female with pmhx of mdd, copd, chronic hypoxia, dvt, pulm fibrosis, dysphagia, who resides at long term care facility in [place]. Regards that she is overall doing well without any acute issues. States her oxygenation is overall stable and was having some issues with fluid overaloaded but this has resolved. Otherwise regards no acute issues or concerns at this time.          Review of Systems   Constitutional:  Positive for fatigue.   HENT: Negative.     Respiratory:  Positive for cough and shortness of breath.    Cardiovascular: Negative.    Gastrointestinal: Negative.    Musculoskeletal: Negative.    Neurological:  Positive for weakness.   Psychiatric/Behavioral: Negative.         Objective Vitals Reviewed via facility EMR   Physical Exam  Constitutional:       General: She is not in acute distress.     Appearance: She is ill-appearing.      Comments: On o2, stable   Eyes:      Pupils: Pupils are equal, round, and reactive to light.   Cardiovascular:      Rate and Rhythm: Normal rate and regular rhythm.      Pulses: Normal pulses.      Heart sounds: No murmur heard.  Pulmonary:      Effort: No respiratory distress.      Breath sounds: No wheezing.   Abdominal:      General: Abdomen is flat. Bowel sounds are normal. There is no distension.   Musculoskeletal:      Right lower leg: No edema.      Left lower leg: No edema.   Skin:     General: Skin is warm and dry.   Neurological:      Mental Status: She is alert. Mental status is at baseline.      Cranial Nerves: No cranial nerve deficit.      Motor: Weakness present.   Psychiatric:         Mood and Affect: Mood normal.         Behavior: Behavior normal.         Assessment/Plan   Diagnoses and all orders for this visit:  Chronic hypoxic respiratory failure (Multi)  Chronic diastolic congestive heart failure  Postinflammatory pulmonary fibrosis (Multi)  Lung nodule  Other emphysema  (Multi)  Hypoalbuminemia due to protein-calorie malnutrition (Multi)  Edema, unspecified type  Gastroesophageal reflux disease, unspecified whether esophagitis present  Hypertension, unspecified type    Patient seen and examined at bedside. Medications reviewed and reconciled. Did have some fluid swelling of the LE however this has resolved. Otherwise continue supportive care. Closely monitor respiratory status. Medicaitons reviwed and reconciled     Reviewed and approved by SANTIAGO LEPE on 3/6/25 at 10:12 PM.

## 2025-03-27 ENCOUNTER — NURSING HOME VISIT (OUTPATIENT)
Dept: POST ACUTE CARE | Facility: EXTERNAL LOCATION | Age: 75
End: 2025-03-27
Payer: MEDICARE

## 2025-03-27 DIAGNOSIS — J96.11 CHRONIC HYPOXIC RESPIRATORY FAILURE: ICD-10-CM

## 2025-03-27 DIAGNOSIS — E78.5 HYPERLIPIDEMIA, UNSPECIFIED HYPERLIPIDEMIA TYPE: ICD-10-CM

## 2025-03-27 DIAGNOSIS — E46 HYPOALBUMINEMIA DUE TO PROTEIN-CALORIE MALNUTRITION (MULTI): ICD-10-CM

## 2025-03-27 DIAGNOSIS — E88.09 HYPOALBUMINEMIA DUE TO PROTEIN-CALORIE MALNUTRITION (MULTI): ICD-10-CM

## 2025-03-27 DIAGNOSIS — J18.9 PNEUMONIA DUE TO INFECTIOUS ORGANISM, UNSPECIFIED LATERALITY, UNSPECIFIED PART OF LUNG: ICD-10-CM

## 2025-03-27 DIAGNOSIS — I50.32 CHRONIC DIASTOLIC CONGESTIVE HEART FAILURE: Primary | ICD-10-CM

## 2025-03-27 DIAGNOSIS — J96.22 ACUTE ON CHRONIC RESPIRATORY FAILURE WITH HYPERCAPNIA: ICD-10-CM

## 2025-03-27 DIAGNOSIS — I10 HYPERTENSION, UNSPECIFIED TYPE: ICD-10-CM

## 2025-03-27 PROCEDURE — 99349 HOME/RES VST EST MOD MDM 40: CPT | Performed by: STUDENT IN AN ORGANIZED HEALTH CARE EDUCATION/TRAINING PROGRAM

## 2025-03-27 ASSESSMENT — ENCOUNTER SYMPTOMS
WHEEZING: 1
SHORTNESS OF BREATH: 1
FATIGUE: 1
PSYCHIATRIC NEGATIVE: 1
CARDIOVASCULAR NEGATIVE: 1
WEAKNESS: 1
GASTROINTESTINAL NEGATIVE: 1
ARTHRALGIAS: 1

## 2025-03-27 NOTE — LETTER
Patient: Christie Guadarrama  : 1950    Encounter Date: 2025    Subjective  Patient ID: Christie Guadarrama is a 74 y.o. female.    Patient seen and examined on sick visit.  Over the past couple days, has been feeling significantly short of breath and not feeling well.  Chest x-ray was performed which showed a right upper lobe predominance.  Since this time, patient has been started on antibiotics as well as steroids and reports slight improvement in symptoms.  Otherwise, states no additional issues or concerns at this time.         Review of Systems   Constitutional:  Positive for fatigue.   HENT: Negative.     Respiratory:  Positive for shortness of breath and wheezing.    Cardiovascular: Negative.    Gastrointestinal: Negative.    Musculoskeletal:  Positive for arthralgias.   Neurological:  Positive for weakness.   Psychiatric/Behavioral: Negative.         ObjectiveVitals Reviewed via facility EMR   Physical Exam  Constitutional:       General: She is not in acute distress.     Appearance: She is ill-appearing.      Comments: Om o2, weak   Eyes:      Pupils: Pupils are equal, round, and reactive to light.   Cardiovascular:      Rate and Rhythm: Normal rate and regular rhythm.      Pulses: Normal pulses.      Heart sounds: No murmur heard.  Pulmonary:      Effort: No respiratory distress.      Breath sounds: No wheezing.   Abdominal:      General: Abdomen is flat. Bowel sounds are normal. There is no distension.   Musculoskeletal:      Right lower leg: No edema.      Left lower leg: No edema.   Skin:     General: Skin is warm and dry.   Neurological:      Mental Status: She is alert. Mental status is at baseline.      Cranial Nerves: No cranial nerve deficit.      Motor: Weakness present.   Psychiatric:         Mood and Affect: Mood normal.         Behavior: Behavior normal.         Assessment/Plan  Diagnoses and all orders for this visit:  Chronic diastolic congestive heart failure  Hyperlipidemia, unspecified  hyperlipidemia type  Hypertension, unspecified type  Hypoalbuminemia due to protein-calorie malnutrition (Multi)  Acute on chronic respiratory failure with hypercapnia (Multi)  Chronic hypoxic respiratory failure (Multi)  Pneumonia due to infectious organism, unspecified laterality, unspecified part of lung      Patient seen and examined at bedside.  Overall somewhat stable, however noting magnesium chest x-ray imaging.  Start breathing treatments and antibiotics as per HPI and closely monitor symptoms.  Will obtain labs next week closely monitor for episodes of hypoxia.  Otherwise continue optimization of activities of daily living discussed care plan with staff and no issues noted.       Electronically Signed By: Art Doan DO   3/27/25 10:04 PM

## 2025-03-28 NOTE — PROGRESS NOTES
Subjective   Patient ID: Christie Guadarrama is a 74 y.o. female.    Patient seen and examined on sick visit.  Over the past couple days, has been feeling significantly short of breath and not feeling well.  Chest x-ray was performed which showed a right upper lobe predominance.  Since this time, patient has been started on antibiotics as well as steroids and reports slight improvement in symptoms.  Otherwise, states no additional issues or concerns at this time.         Review of Systems   Constitutional:  Positive for fatigue.   HENT: Negative.     Respiratory:  Positive for shortness of breath and wheezing.    Cardiovascular: Negative.    Gastrointestinal: Negative.    Musculoskeletal:  Positive for arthralgias.   Neurological:  Positive for weakness.   Psychiatric/Behavioral: Negative.         Objective Vitals Reviewed via facility EMR   Physical Exam  Constitutional:       General: She is not in acute distress.     Appearance: She is ill-appearing.      Comments: Om o2, weak   Eyes:      Pupils: Pupils are equal, round, and reactive to light.   Cardiovascular:      Rate and Rhythm: Normal rate and regular rhythm.      Pulses: Normal pulses.      Heart sounds: No murmur heard.  Pulmonary:      Effort: No respiratory distress.      Breath sounds: No wheezing.   Abdominal:      General: Abdomen is flat. Bowel sounds are normal. There is no distension.   Musculoskeletal:      Right lower leg: No edema.      Left lower leg: No edema.   Skin:     General: Skin is warm and dry.   Neurological:      Mental Status: She is alert. Mental status is at baseline.      Cranial Nerves: No cranial nerve deficit.      Motor: Weakness present.   Psychiatric:         Mood and Affect: Mood normal.         Behavior: Behavior normal.         Assessment/Plan   Diagnoses and all orders for this visit:  Chronic diastolic congestive heart failure  Hyperlipidemia, unspecified hyperlipidemia type  Hypertension, unspecified type  Hypoalbuminemia  due to protein-calorie malnutrition (Multi)  Acute on chronic respiratory failure with hypercapnia (Multi)  Chronic hypoxic respiratory failure (Multi)  Pneumonia due to infectious organism, unspecified laterality, unspecified part of lung      Patient seen and examined at bedside.  Overall somewhat stable, however noting magnesium chest x-ray imaging.  Start breathing treatments and antibiotics as per HPI and closely monitor symptoms.  Will obtain labs next week closely monitor for episodes of hypoxia.  Otherwise continue optimization of activities of daily living discussed care plan with staff and no issues noted.